# Patient Record
Sex: MALE | Race: WHITE | NOT HISPANIC OR LATINO | Employment: FULL TIME | URBAN - METROPOLITAN AREA
[De-identification: names, ages, dates, MRNs, and addresses within clinical notes are randomized per-mention and may not be internally consistent; named-entity substitution may affect disease eponyms.]

---

## 2023-07-07 DIAGNOSIS — E78.5 HYPERLIPIDEMIA, UNSPECIFIED: ICD-10-CM

## 2023-07-10 RX ORDER — ATORVASTATIN CALCIUM 80 MG/1
80 TABLET, FILM COATED ORAL DAILY
Qty: 90 TABLET | Refills: 3 | Status: SHIPPED | OUTPATIENT
Start: 2023-07-10

## 2024-04-24 DIAGNOSIS — T37.2X5A: Primary | ICD-10-CM

## 2024-04-24 RX ORDER — ATOVAQUONE AND PROGUANIL HYDROCHLORIDE 250; 100 MG/1; MG/1
1 TABLET, FILM COATED ORAL DAILY
Qty: 30 TABLET | Refills: 1 | Status: SHIPPED | OUTPATIENT
Start: 2024-04-24

## 2024-07-30 ENCOUNTER — LAB (OUTPATIENT)
Dept: LAB | Facility: LAB | Age: 79
End: 2024-07-30
Payer: COMMERCIAL

## 2024-07-30 ENCOUNTER — APPOINTMENT (OUTPATIENT)
Dept: PRIMARY CARE | Facility: CLINIC | Age: 79
End: 2024-07-30

## 2024-07-30 VITALS
TEMPERATURE: 97.8 F | HEIGHT: 70 IN | BODY MASS INDEX: 23.62 KG/M2 | WEIGHT: 165 LBS | DIASTOLIC BLOOD PRESSURE: 76 MMHG | HEART RATE: 64 BPM | OXYGEN SATURATION: 98 % | SYSTOLIC BLOOD PRESSURE: 151 MMHG

## 2024-07-30 DIAGNOSIS — Z12.5 ENCOUNTER FOR SCREENING FOR MALIGNANT NEOPLASM OF PROSTATE: ICD-10-CM

## 2024-07-30 DIAGNOSIS — Z00.00 HEALTHCARE MAINTENANCE: ICD-10-CM

## 2024-07-30 DIAGNOSIS — I34.1 MITRAL VALVE PROLAPSE: ICD-10-CM

## 2024-07-30 DIAGNOSIS — G89.29 CHRONIC BILATERAL LOW BACK PAIN WITHOUT SCIATICA: Primary | ICD-10-CM

## 2024-07-30 DIAGNOSIS — E78.2 MIXED HYPERLIPIDEMIA: ICD-10-CM

## 2024-07-30 DIAGNOSIS — Z12.11 ENCOUNTER FOR SCREENING FOR MALIGNANT NEOPLASM OF COLON: ICD-10-CM

## 2024-07-30 DIAGNOSIS — M54.50 CHRONIC BILATERAL LOW BACK PAIN WITHOUT SCIATICA: Primary | ICD-10-CM

## 2024-07-30 PROBLEM — I25.10 CALCIFICATION OF CORONARY ARTERY: Status: RESOLVED | Noted: 2018-03-19 | Resolved: 2024-07-30

## 2024-07-30 PROBLEM — E78.5 HYPERLIPIDEMIA: Status: ACTIVE | Noted: 2024-07-30

## 2024-07-30 PROBLEM — I25.84 CALCIFICATION OF CORONARY ARTERY: Status: RESOLVED | Noted: 2018-03-19 | Resolved: 2024-07-30

## 2024-07-30 PROBLEM — M51.36 LUMBAR DEGENERATIVE DISC DISEASE: Status: ACTIVE | Noted: 2018-03-19

## 2024-07-30 PROBLEM — M51.369 LUMBAR DEGENERATIVE DISC DISEASE: Status: ACTIVE | Noted: 2018-03-19

## 2024-07-30 PROBLEM — G47.00 INSOMNIA: Status: RESOLVED | Noted: 2024-07-30 | Resolved: 2024-07-30

## 2024-07-30 LAB
ALBUMIN SERPL BCP-MCNC: 4.3 G/DL (ref 3.4–5)
ALP SERPL-CCNC: 37 U/L (ref 33–136)
ALT SERPL W P-5'-P-CCNC: 25 U/L (ref 10–52)
ANION GAP SERPL CALC-SCNC: 13 MMOL/L (ref 10–20)
APPEARANCE UR: CLEAR
AST SERPL W P-5'-P-CCNC: 27 U/L (ref 9–39)
BASOPHILS # BLD AUTO: 0.03 X10*3/UL (ref 0–0.1)
BASOPHILS NFR BLD AUTO: 0.5 %
BILIRUB SERPL-MCNC: 1.1 MG/DL (ref 0–1.2)
BILIRUB UR STRIP.AUTO-MCNC: NEGATIVE MG/DL
BUN SERPL-MCNC: 22 MG/DL (ref 6–23)
CALCIUM SERPL-MCNC: 9.6 MG/DL (ref 8.6–10.6)
CHLORIDE SERPL-SCNC: 103 MMOL/L (ref 98–107)
CHOLEST SERPL-MCNC: 164 MG/DL (ref 0–199)
CHOLESTEROL/HDL RATIO: 2
CO2 SERPL-SCNC: 27 MMOL/L (ref 21–32)
COLOR UR: ABNORMAL
CREAT SERPL-MCNC: 1.05 MG/DL (ref 0.5–1.3)
EGFRCR SERPLBLD CKD-EPI 2021: 73 ML/MIN/1.73M*2
EOSINOPHIL # BLD AUTO: 0.04 X10*3/UL (ref 0–0.4)
EOSINOPHIL NFR BLD AUTO: 0.7 %
ERYTHROCYTE [DISTWIDTH] IN BLOOD BY AUTOMATED COUNT: 13.2 % (ref 11.5–14.5)
GLUCOSE SERPL-MCNC: 114 MG/DL (ref 74–99)
GLUCOSE UR STRIP.AUTO-MCNC: NORMAL MG/DL
HCT VFR BLD AUTO: 40 % (ref 41–52)
HCV AB SER QL: NONREACTIVE
HDLC SERPL-MCNC: 83.7 MG/DL
HGB BLD-MCNC: 12.9 G/DL (ref 13.5–17.5)
IMM GRANULOCYTES # BLD AUTO: 0.02 X10*3/UL (ref 0–0.5)
IMM GRANULOCYTES NFR BLD AUTO: 0.4 % (ref 0–0.9)
KETONES UR STRIP.AUTO-MCNC: NEGATIVE MG/DL
LDLC SERPL CALC-MCNC: 70 MG/DL
LEUKOCYTE ESTERASE UR QL STRIP.AUTO: ABNORMAL
LYMPHOCYTES # BLD AUTO: 1.18 X10*3/UL (ref 0.8–3)
LYMPHOCYTES NFR BLD AUTO: 21.6 %
MCH RBC QN AUTO: 31.6 PG (ref 26–34)
MCHC RBC AUTO-ENTMCNC: 32.3 G/DL (ref 32–36)
MCV RBC AUTO: 98 FL (ref 80–100)
MONOCYTES # BLD AUTO: 0.66 X10*3/UL (ref 0.05–0.8)
MONOCYTES NFR BLD AUTO: 12.1 %
NEUTROPHILS # BLD AUTO: 3.54 X10*3/UL (ref 1.6–5.5)
NEUTROPHILS NFR BLD AUTO: 64.7 %
NITRITE UR QL STRIP.AUTO: NEGATIVE
NON HDL CHOLESTEROL: 80 MG/DL (ref 0–149)
NRBC BLD-RTO: 0 /100 WBCS (ref 0–0)
PH UR STRIP.AUTO: 5.5 [PH]
PLATELET # BLD AUTO: 208 X10*3/UL (ref 150–450)
POTASSIUM SERPL-SCNC: 4.2 MMOL/L (ref 3.5–5.3)
PROT SERPL-MCNC: 6.7 G/DL (ref 6.4–8.2)
PROT UR STRIP.AUTO-MCNC: NEGATIVE MG/DL
PSA SERPL-MCNC: 3.52 NG/ML
RBC # BLD AUTO: 4.08 X10*6/UL (ref 4.5–5.9)
RBC # UR STRIP.AUTO: NEGATIVE /UL
RBC #/AREA URNS AUTO: NORMAL /HPF
SODIUM SERPL-SCNC: 139 MMOL/L (ref 136–145)
SP GR UR STRIP.AUTO: 1.02
TRIGL SERPL-MCNC: 51 MG/DL (ref 0–149)
UROBILINOGEN UR STRIP.AUTO-MCNC: NORMAL MG/DL
VLDL: 10 MG/DL (ref 0–40)
WBC # BLD AUTO: 5.5 X10*3/UL (ref 4.4–11.3)
WBC #/AREA URNS AUTO: NORMAL /HPF

## 2024-07-30 PROCEDURE — 85025 COMPLETE CBC W/AUTO DIFF WBC: CPT

## 2024-07-30 PROCEDURE — 80061 LIPID PANEL: CPT

## 2024-07-30 PROCEDURE — 1158F ADVNC CARE PLAN TLK DOCD: CPT | Performed by: STUDENT IN AN ORGANIZED HEALTH CARE EDUCATION/TRAINING PROGRAM

## 2024-07-30 PROCEDURE — 1160F RVW MEDS BY RX/DR IN RCRD: CPT | Performed by: STUDENT IN AN ORGANIZED HEALTH CARE EDUCATION/TRAINING PROGRAM

## 2024-07-30 PROCEDURE — 99397 PER PM REEVAL EST PAT 65+ YR: CPT | Performed by: STUDENT IN AN ORGANIZED HEALTH CARE EDUCATION/TRAINING PROGRAM

## 2024-07-30 PROCEDURE — 1036F TOBACCO NON-USER: CPT | Performed by: STUDENT IN AN ORGANIZED HEALTH CARE EDUCATION/TRAINING PROGRAM

## 2024-07-30 PROCEDURE — 99214 OFFICE O/P EST MOD 30 MIN: CPT | Performed by: STUDENT IN AN ORGANIZED HEALTH CARE EDUCATION/TRAINING PROGRAM

## 2024-07-30 PROCEDURE — 1123F ACP DISCUSS/DSCN MKR DOCD: CPT | Performed by: STUDENT IN AN ORGANIZED HEALTH CARE EDUCATION/TRAINING PROGRAM

## 2024-07-30 PROCEDURE — 81001 URINALYSIS AUTO W/SCOPE: CPT

## 2024-07-30 PROCEDURE — 1126F AMNT PAIN NOTED NONE PRSNT: CPT | Performed by: STUDENT IN AN ORGANIZED HEALTH CARE EDUCATION/TRAINING PROGRAM

## 2024-07-30 PROCEDURE — 36415 COLL VENOUS BLD VENIPUNCTURE: CPT

## 2024-07-30 PROCEDURE — 84153 ASSAY OF PSA TOTAL: CPT

## 2024-07-30 PROCEDURE — 80053 COMPREHEN METABOLIC PANEL: CPT

## 2024-07-30 PROCEDURE — 86803 HEPATITIS C AB TEST: CPT

## 2024-07-30 PROCEDURE — 1159F MED LIST DOCD IN RCRD: CPT | Performed by: STUDENT IN AN ORGANIZED HEALTH CARE EDUCATION/TRAINING PROGRAM

## 2024-07-30 ASSESSMENT — PAIN SCALES - GENERAL: PAINLEVEL: 0-NO PAIN

## 2024-07-30 NOTE — PROGRESS NOTES
"Subjective   Patient ID: Willy Cordero is a 78 y.o. male who presents for Annual Exam.    HPI   Doing well since last in! His research continues to thrive and one of his children is getting  this October. Willy has no acute concerns today. Several chronic conditions that are all well controlled.      Re: MVP - longstanding diagnosis. Due for TTE. Murmur is still prominent on exam, remains asx.     Re: back - See NSG notes. Reports subacute to chronic onset of low back pain. Mild-moderate in intensity, locks up on occasion. Pain is in lumbar area, with no radiation into the buttock. Used some conservative therapy (NSAIDs, stretching, heat/ice, etc...) but without regularity. Has not recently tried physical therapy and requests an order, which is reasonable.      Re: HLD - well controlled lipids.      Re: HM - due for PSA. CRS due.  All immunizations are UTD.     PMHx, FHx, Social Hx, Surg Hx personally reviewed at this appointment. No pertinent findings and/or changes from prior (if applicable).     ROS: Denies wt gain/loss f/c HA LoC CP SOB NVDC. See HPI above, and scanned sheet (if applicable). All other systems are reviewed and are without complaint.     Review of Systems    Objective   /76   Pulse 64   Temp 36.6 °C (97.8 °F)   Ht 1.778 m (5' 10\")   Wt 74.8 kg (165 lb)   SpO2 98%   BMI 23.68 kg/m²     Physical Exam  Gen: well developed in NAD. AAO x3.  HEENT: NC/AT. Anicteric sclera, symmetric pupils. MMM no thrush. Wearing hearing aides.   Neck: Soft, supple. No LAD. No goiter.   CV: Bradycardic. III/VI mid-late systolic murmur at apex radiating to the axilla.   Pulm: CTAB no w/r/r, good air exchange  GI: soft NTND BS+ no hsm  Ext: WWP no edema  Neuro: II-XII grossly intact, nonfocal systemic findings  MSK: 5/5 strength b/l UE and LE  Gait: unremarkable     Lab Results   Component Value Date    WBC 4.0 (L) 12/14/2022    HGB 12.1 (L) 12/14/2022    HCT 37.6 (L) 12/14/2022     12/14/2022 "    CHOL 157 12/14/2022    TRIG 38 12/14/2022    HDL 84.7 12/14/2022    ALT 27 12/14/2022    AST 33 12/14/2022     12/14/2022    K 4.6 12/14/2022     12/14/2022    CREATININE 1.18 12/14/2022    BUN 25 (H) 12/14/2022    CO2 28 12/14/2022    TSH 2.18 10/13/2021    PSA 2.94 10/13/2021     par    MR lumbar spine wo IV contrast  Narrative: Interpreted By:  LEXIS OSPINA MD, PHD and ROWAN BURGOS MD  MRN: [id number]  Patient Name: AUBREY VALLES     STUDY:  MRI L-SPINE WO;  5/3/2023 7:00 pm     INDICATION:  Previous fusion, L3-5 pain, right lumbar radiculopathy.     COMPARISON:  Lumbar spine MRI dated 05/16/2017, Lumbosacral spine radiographs  dated 04/19/2023     ACCESSION NUMBER(S):  [id number]     ORDERING CLINICIAN:  KAM LOW     TECHNIQUE:  Sagittal T1, T2, STIR, axial T1 and T2 weighted images of the lumbar  spine were acquired. Metal artifact reduction techniques were  utilized.     FINDINGS:  5 lumbar-type vertebral bodies are again noted, the last well-formed  disc space is labeled L5-S1.     Alignment: Mild retrolisthesis at L2-3 is more pronounced than on the  prior study, the alignment is otherwise preserved.     Vertebrae/Intervertebral Discs: Changes of L3-5 laminectomy and  posterior fusion with associated susceptibility artifact are similar  to the recent radiographs given the differences in technique but new  from the prior MRI. Vertebral body height is preserved. Degenerative  endplate changes with associated marrow edema at L2-3 and L5-S1. T1  hyperintense nodules at L2 and L3 are unchanged and consistent with  benign hemangiomas. Multilevel loss of normal disc T2 signal and disc  height.     Conus Medullaris: The lower thoracic cord appears unremarkable. The  conus terminates at L1. The cauda equina is unremarkable.     T12-L1: Facet hypertrophy without canal or foraminal narrowing.     L1-2: Disc bulge, facet hypertrophy, and ligamentum flavum thickening  with mild narrowing of both  subarticular recesses, slightly worsened  from previous.     L2-3: Disc bulge, facet hypertrophy, and ligamentum flavum thickening  with diffuse mild spinal canal narrowing and mild bilateral neural  foramen narrowing, slightly worsened from previous.     L3-4: Postoperative changes with a disc osteophyte complex facet  hypertrophy. There is no residual central spinal canal narrowing and  mild narrowing of both subarticular recesses is improved from  previous, mild bilateral neural foramen narrowing appears unchanged.     L4-5: Postoperative changes with a disc osteophyte complex and facet  hypertrophy. There is no residual spinal canal narrowing, moderate  bilateral neural foramen narrowing is slightly improved from previous.     L5-S1: Postoperative changes with a disc bulge, facet hypertrophy,  and ligamentum flavum thickening. Mild left and severe right neural  foramen narrowing is similar to previous, no spinal canal narrowing.     Postoperative changes in the posterior soft tissues with associated  granulation tissue and paraspinal muscular atrophy, no associated  fluid collection.     Impression: 1. Changes of L3-5 laminectomy and posterior fusion with improved  patency of the spinal canal.  2. Slight progression degenerative changes in the upper lumbar spine,  no new high-grade canal or foraminal narrowing is identified.     I personally reviewed the image(s) / study and the interpretation of  Dr. Willy Magdaleno MD. I agree with the findings as stated. This  study was interpreted at [facility], [place], [place].      Current Outpatient Medications   Medication Instructions    atorvastatin (LIPITOR) 80 mg, oral, Daily        Assessment/Plan   # back pain with known lumbar stenosis/DDD  - conservative management with PT, massage therapy, acupuncture and topical creams if amenable  - follow up NSG if considerably worsens, see Dr. Melo's notes (2023)     # MVP: asymptomatic, due for repeat  testing q2 years  - TTE due, will carbon copy results to Dr. Muniz    # hyperlipidemia  - lipid panel, ASCVD+ score based on these values if age appropriate  - continue statin      # Health Maintenance  - routine blood work  - Colonoscopy: due, ordered today   - PSA: due, ordered today   - Immunizations: UTD    Problem List Items Addressed This Visit             ICD-10-CM    Hyperlipidemia E78.5    Relevant Orders    Comprehensive Metabolic Panel    CBC and Auto Differential    Lipid Panel    Urinalysis with Reflex Microscopic    Mitral valve prolapse I34.1    Relevant Orders    Transthoracic Echo (TTE) Complete    Urinalysis with Reflex Microscopic     Other Visit Diagnoses         Codes    Chronic bilateral low back pain without sciatica    -  Primary M54.50, G89.29    Relevant Orders    Referral to Physical Therapy    Encounter for screening for malignant neoplasm of colon     Z12.11    Relevant Orders    Colonoscopy Screening; Average Risk Patient    Healthcare maintenance     Z00.00    Relevant Orders    Comprehensive Metabolic Panel    CBC and Auto Differential    Lipid Panel    Prostate Specific Antigen    Hepatitis C Antibody    Urinalysis with Reflex Microscopic    Encounter for screening for malignant neoplasm of prostate     Z12.5    Relevant Orders    Prostate Specific Antigen

## 2024-07-30 NOTE — PATIENT INSTRUCTIONS
Please stop at the lab ([address]) to complete your blood and/or urine work that I've ordered for you.    I will contact you with the results at my soonest convenience. I strongly urge you to use [employer] as this is the quickest and easiest way to access your results and receive my correspondences.    Your medications are up to date today, I will renew them when a refill is due.    I have ordered a screening colonoscopy. Please call [phone] to schedule the appointment. I recommend being the first appointment of the day, if possible.     I have referred you to physical therapy to help with strengthening, stretching, and back pain. Please call [phone] to schedule at a convenient  location.      I have ordered an echocardiogram to better evaluate your heart. Please stop by the cardiology department on the third floor or call [phone] to schedule this study.     See me yearly for a complete physical exam, and sooner as needed for sick visits

## 2024-08-02 DIAGNOSIS — Z12.11 COLON CANCER SCREENING: ICD-10-CM

## 2024-08-02 RX ORDER — POLYETHYLENE GLYCOL 3350, SODIUM CHLORIDE, SODIUM BICARBONATE, POTASSIUM CHLORIDE 420; 11.2; 5.72; 1.48 G/4L; G/4L; G/4L; G/4L
POWDER, FOR SOLUTION ORAL
Qty: 4000 ML | Refills: 0 | Status: SHIPPED | OUTPATIENT
Start: 2024-08-02

## 2024-08-21 ENCOUNTER — HOSPITAL ENCOUNTER (OUTPATIENT)
Dept: CARDIOLOGY | Facility: CLINIC | Age: 79
Discharge: HOME | End: 2024-08-21
Payer: COMMERCIAL

## 2024-08-21 DIAGNOSIS — I34.1 MITRAL VALVE PROLAPSE: ICD-10-CM

## 2024-08-21 LAB
AORTIC VALVE PEAK VELOCITY: 1.1 M/S
AV PEAK GRADIENT: 4.8 MMHG
AVA (PEAK VEL): 2.54 CM2
EJECTION FRACTION APICAL 4 CHAMBER: 69.3
EJECTION FRACTION: 63 %
LEFT ATRIUM VOLUME AREA LENGTH INDEX BSA: 78 ML/M2
LEFT VENTRICLE INTERNAL DIMENSION DIASTOLE: 4.84 CM (ref 3.5–6)
LEFT VENTRICULAR OUTFLOW TRACT DIAMETER: 2.11 CM
MITRAL VALVE E/A RATIO: 2.83
RIGHT VENTRICLE FREE WALL PEAK S': 14 CM/S
RIGHT VENTRICLE PEAK SYSTOLIC PRESSURE: 44.7 MMHG
TRICUSPID ANNULAR PLANE SYSTOLIC EXCURSION: 2 CM

## 2024-08-21 PROCEDURE — 93306 TTE W/DOPPLER COMPLETE: CPT

## 2024-08-21 PROCEDURE — 93306 TTE W/DOPPLER COMPLETE: CPT | Performed by: INTERNAL MEDICINE

## 2024-08-22 NOTE — RESULT ENCOUNTER NOTE
Reviewed echocardiogram results with patient.  Severe MR with normal LV function but asymptomatic.  With increasing pulmonary pressure, he is likely candidate for elective surgical repair (grade 2C).  Some progression as compared to previous echocardiogram.  Will see him in the office in late September, after his daughter's wedding, then discuss cardiothoracic surgery referral.  No other comorbidities.  Never had atrial fibrillation.

## 2024-08-27 ENCOUNTER — EVALUATION (OUTPATIENT)
Dept: PHYSICAL THERAPY | Facility: CLINIC | Age: 79
End: 2024-08-27
Payer: COMMERCIAL

## 2024-08-27 DIAGNOSIS — M51.36 LUMBAR DEGENERATIVE DISC DISEASE: Primary | ICD-10-CM

## 2024-08-27 DIAGNOSIS — M54.50 CHRONIC BILATERAL LOW BACK PAIN WITHOUT SCIATICA: ICD-10-CM

## 2024-08-27 DIAGNOSIS — G89.29 CHRONIC BILATERAL LOW BACK PAIN WITHOUT SCIATICA: ICD-10-CM

## 2024-08-27 PROCEDURE — 97110 THERAPEUTIC EXERCISES: CPT | Mod: GP | Performed by: PHYSICAL THERAPIST

## 2024-08-27 PROCEDURE — 97161 PT EVAL LOW COMPLEX 20 MIN: CPT | Mod: GP | Performed by: PHYSICAL THERAPIST

## 2024-08-27 ASSESSMENT — ENCOUNTER SYMPTOMS
OCCASIONAL FEELINGS OF UNSTEADINESS: 0
DEPRESSION: 0
LOSS OF SENSATION IN FEET: 0

## 2024-08-27 ASSESSMENT — PATIENT HEALTH QUESTIONNAIRE - PHQ9
SUM OF ALL RESPONSES TO PHQ9 QUESTIONS 1 AND 2: 0
1. LITTLE INTEREST OR PLEASURE IN DOING THINGS: NOT AT ALL
2. FEELING DOWN, DEPRESSED OR HOPELESS: NOT AT ALL

## 2024-08-27 NOTE — PROGRESS NOTES
Physical Therapy  Physical Therapy Orthopedic Evaluation    Patient Name: Willy Cordero  MRN: [id number]  Today's Date: 8/27/2024  Time Calculation  Start Time: 0657  Stop Time: 0739  Time Calculation (min): 42 min    PT Evaluation Time Entry  PT Evaluation (Low) Time Entry: 15  PT Therapeutic Procedures Time Entry  Therapeutic Exercise Time Entry: 25       Insurance:  Visit number: 1 of 40  Authorization info: No Auth  Insurance Type: Payor: MEDICAL [facility] / Plan: [employer]ICAL [employer] / Product Type: *No Product type* /      Current Problem     Problem List Items Addressed This Visit             ICD-10-CM    Lumbar degenerative disc disease - Primary M51.36    Relevant Orders    Follow Up In Physical Therapy     Other Visit Diagnoses         Codes    Chronic bilateral low back pain without sciatica     M54.50, G89.29            Referred by: Tyson Lr MD    Precautions:   Lumbar Fusion L3-L5 2018    Subjective:   Subjective   Chief Complaint: Chronic Low back Pain  Onset: Low back pain  VIKAS: Lumbar Fusion    General:   subacute to chronic onset of low back pain. Mild-moderate in intensity, locks up on occasion. Pain is in lumbar area, with no radiation into the buttock. Used some conservative therapy (NSAIDs, stretching, heat/ice, etc...) but without regularity.   Current Condition:   Same, occasional muscle cramps 1-2x every week or two while in bed, either leg. Pain meds PRN, No other treatments    Pain:     Location: Low back pain   Rating: Best-0, Current-0, Worst-5  Description: muscle spasms and low back discomfort  Aggravating Factors:  standing, walking, weightbearing exercise  Relieving Factors:  sitting, laying down    Relevant Information (PMH & Previous Tests/Imaging): No recent imaging     Previous Interventions/Treatments: Physical Therapy    Prior Level of Function (PLOF)  Patient previously independent with all ADLs  Exercise/Physical Activity: Active outdoors, would like to  return to [place]  Work/School: Physician in medical research- traveling    Patients Living Environment: Reviewed and no concern  Primary Language: English  Patient's Goal(s) for Therapy: Reduce low back pain, increase standing tolerance greater than 10 minutes    Red Flags: Do you have any of the following? No  Fever/chills, unexplained weight changes, dizziness/fainting, unexplained change in bowel or bladder functions, unexplained malaise or muscle weakness, night pain/sweats, numbness or tingling    Objective:  Objective     Palpation/Observation  Mild tenderness to palpation right side lumbar paraspinals  Posture  Patient does present with forward head position mild, rounded shoulders, flexed at the trunk 10 degrees static standing position  Special Testing  Negative slump test, straight leg raise, compression and gap testing, logroll  Positive Vincent test  ROM  8/27/2024  Trunk  Flexion 50% limited without pain  Extension patient is only able to achieve the neutral position from being forward flexed  Bilateral rotation is within normal limits  Hip range of motion 40 degrees bilateral internal/external at 90 degrees of hip flexion, logroll position full range of motion  Bilateral hip flexion roughly 110 degrees  No restriction, within functional limits at the foot and knee  Strength  8/27/2024  Lower extremity strength 4/5 bilateral knee flexion, all of the areas are within normal limits  Sensation  Today's presentation shows no radicular paresthesias, full sensation  Reflexes  Negative clonus    Transfers: Independently performed with upper extremity assistance  Gait: Independent without assistive device, shortened stride length bilaterally  SL Balance:   DL Squat: Sit to stand performed with minimal upper extremity assistance independently  SL Squat:      Outcome Measures:  Other Measures  Oswestry Disablity Index (ATUL): 22%   8/27/2024  Treatments:  Ther-EX:  - Supine Single Knee to Chest Stretch  - 1-2 x  "daily - 3 reps - 30\" hold  - Supine Hamstring Stretch  - 1-2 x daily - 3 reps - 30\" hold  - Supine Piriformis Stretch with Foot on Ground  - 1-2 x daily - 1 sets - 3 reps - 30\" hold  - Supine Bridge with Mini Swiss Ball Between Knees  - 1 x daily - 1 sets - 15 reps - 2\" hold  - Supine Posterior Pelvic Tilt  - 1 x daily - 1 sets - 15 reps  - Standing Hip Flexor Stretch  - 1-2 x daily - 1 sets - 3 reps - 15-20\" hold  - Hip Flexor Stretch on Step  - 1-2 x daily - 1 sets - 3 reps - 15-20\" hold  PT Evaluation Time Entry  PT Evaluation (Low) Time Entry: 15  PT Therapeutic Procedures Time Entry  Therapeutic Exercise Time Entry: 25       EDUCATION: Home exercise program, plan of care, activity modifications, pain management, and injury pathology     Code: [id number]    Medical History Form: Reviewed (scanned into chart)  Reviewed medical form, [place], Falls risk, Islam beliefs, PHQ     Screening  Frequency  Date Last Completed   Spiritual and Cultural Beliefs   Screening  each visit or episode of care    Falls Risk Screening  every ambulatory visit [unfilled]   Pain Screening  annually at primary care visit  7/30/2024   Domestic Violence screening  annually at primary care visit    Elder Abuse Screening  annually at primary care visit    Depression Screening  annually in the primary care setting 8/27/2024   Suicide Risk Screening  annually in the primary care setting    Nutrition and Food Insecurity   Screening  at least annually at primary care visit     Key Learner  annually in the primary care setting    Drug Screen  7/30/2024  1:07 PM   Alcohol Screen  7/30/2024  1:07 PM   Advance Directive         Time Calculation  Start Time: 0657  Stop Time: 0739  Time Calculation (min): 42 min  PT Evaluation Time Entry  PT Evaluation (Low) Time Entry: 15 PT Therapeutic Procedures Time Entry  Therapeutic Exercise Time Entry: 25          Assessment: Patient presents with signs and symptoms consistent with lumbar DDD, " resulting in limited participation in pain-free ADLs and inability to perform at their prior level of function. Pt would benefit from physical therapy to address the impairments found & listed previously in the objective section in order to return to safe and pain-free ADLs and prior level of function.     Complexity:Low  Prognosis: Fair  Response to care: Good  Clinical Presentation: Stable and/or uncomplicated characteristics  Personal Factors: None    Problem List:  Pain  Function  Mobility  Activity tolerance  Plan:     Planned Interventions include: therapeutic exercise, self-care home management, manual therapy, therapeutic activities, gait training, neuromuscular coordination, vasopneumatic, dry needling, aquatic therapy    Next Treatment: Progress soft tissue mobilization, hip flexor stretching, core strengthening, exercises to promote lumbar extension  Frequency: 1 x Week  Duration: 6 Weeks  Goals: Set and discussed today  Active       PT Problem       PT Goal 1       Start:  08/27/24    Expected End:  10/22/24       1.  Patient to be independent with home exercise program  2.  Patient to have improved Oswestry disability index score by 10% for decreased pain with daily activities  3.  Patient to have negative Hardy's test for improved hip mobility, to improve walking pattern  4.  Patient to report improved ability to stand longer than 15 minutes without pain/spasms through the low back  5.  Patient to have pain less than 3/10 for improved exercise tolerance greater than 20 minutes             Plan of care was developed with input and agreement by the patient      Nii Dowling, PT

## 2024-09-24 PROBLEM — K21.9 GASTROESOPHAGEAL REFLUX DISEASE: Status: ACTIVE | Noted: 2024-09-24

## 2024-09-24 PROBLEM — N52.9 ERECTILE DYSFUNCTION: Status: ACTIVE | Noted: 2024-09-24

## 2024-09-24 PROBLEM — H81.10 BENIGN PAROXYSMAL POSITIONAL VERTIGO: Status: ACTIVE | Noted: 2024-09-24

## 2024-09-24 PROBLEM — R76.11 POSITIVE REACTION TO TUBERCULIN SKIN TEST: Status: ACTIVE | Noted: 2024-09-24

## 2024-09-25 ENCOUNTER — OFFICE VISIT (OUTPATIENT)
Dept: CARDIOLOGY | Facility: CLINIC | Age: 79
End: 2024-09-25
Payer: COMMERCIAL

## 2024-09-25 VITALS
WEIGHT: 164.06 LBS | HEIGHT: 71 IN | SYSTOLIC BLOOD PRESSURE: 135 MMHG | HEART RATE: 53 BPM | DIASTOLIC BLOOD PRESSURE: 70 MMHG | OXYGEN SATURATION: 96 % | BODY MASS INDEX: 22.97 KG/M2

## 2024-09-25 DIAGNOSIS — I34.1 MITRAL VALVE PROLAPSE: Primary | ICD-10-CM

## 2024-09-25 DIAGNOSIS — I34.0 SEVERE MITRAL REGURGITATION: ICD-10-CM

## 2024-09-25 PROCEDURE — 1036F TOBACCO NON-USER: CPT | Performed by: INTERNAL MEDICINE

## 2024-09-25 PROCEDURE — 99214 OFFICE O/P EST MOD 30 MIN: CPT | Performed by: INTERNAL MEDICINE

## 2024-09-25 PROCEDURE — 1125F AMNT PAIN NOTED PAIN PRSNT: CPT | Performed by: INTERNAL MEDICINE

## 2024-09-25 PROCEDURE — 1160F RVW MEDS BY RX/DR IN RCRD: CPT | Performed by: INTERNAL MEDICINE

## 2024-09-25 PROCEDURE — 1123F ACP DISCUSS/DSCN MKR DOCD: CPT | Performed by: INTERNAL MEDICINE

## 2024-09-25 PROCEDURE — 93005 ELECTROCARDIOGRAM TRACING: CPT | Performed by: INTERNAL MEDICINE

## 2024-09-25 PROCEDURE — 1159F MED LIST DOCD IN RCRD: CPT | Performed by: INTERNAL MEDICINE

## 2024-09-25 ASSESSMENT — COLUMBIA-SUICIDE SEVERITY RATING SCALE - C-SSRS
1. IN THE PAST MONTH, HAVE YOU WISHED YOU WERE DEAD OR WISHED YOU COULD GO TO SLEEP AND NOT WAKE UP?: NO
6. HAVE YOU EVER DONE ANYTHING, STARTED TO DO ANYTHING, OR PREPARED TO DO ANYTHING TO END YOUR LIFE?: NO
2. HAVE YOU ACTUALLY HAD ANY THOUGHTS OF KILLING YOURSELF?: NO

## 2024-09-25 ASSESSMENT — ENCOUNTER SYMPTOMS
LOSS OF SENSATION IN FEET: 0
DEPRESSION: 0
BACK PAIN: 1
DYSPNEA ON EXERTION: 0
SYNCOPE: 0
PALPITATIONS: 0
OCCASIONAL FEELINGS OF UNSTEADINESS: 1

## 2024-09-25 ASSESSMENT — PATIENT HEALTH QUESTIONNAIRE - PHQ9
2. FEELING DOWN, DEPRESSED OR HOPELESS: NOT AT ALL
SUM OF ALL RESPONSES TO PHQ9 QUESTIONS 1 AND 2: 0
1. LITTLE INTEREST OR PLEASURE IN DOING THINGS: NOT AT ALL

## 2024-09-25 ASSESSMENT — PAIN SCALES - GENERAL: PAINLEVEL: 2

## 2024-09-25 NOTE — PROGRESS NOTES
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    HPI/Summary  Willy Cordero is a 78 y.o. male who presents for followup cardiology evaluation.     The patient presents with hyperlipidemia and mitral regurgitation.  In 2016, a coronary calcium score was 437.  He has been maintained on high-dose atorvastatin.    He presents to review a recent echocardiogram, dated August 21, 2024.  The LV ejection fraction is 63%, with normal RV systolic function.  The mitral valve is myxomatous, with severe eccentric posterior MR.  There is a large proximal flow convergence zone and possible pulmonary vein flow reversal.  The RVSP is elevated at 45 mmHg.  We reviewed the echocardiogram and the plan was for an office visit followed by cardiothoracic surgery consultation.  No prior history of atrial fibrillation.    Continues on atorvastatin 80 mg daily, no other medications.  A recent LDL cholesterol was 70 mg/dL.    He feels well.  No palpitations.  Not dyspneic with usual daily activities.  Still travels.  Some back pain secondary to scoliosis, he does some physical therapy exercises.  Specialty Problems          Cardiology Problems    Mitral valve prolapse    Hyperlipidemia        Past Medical History:   Diagnosis Date    Benign paroxysmal vertigo, unspecified ear     Benign paroxysmal positional vertigo    Calcification of coronary artery 03/19/2018    CT cardiac scoring done 12/15/16 with score of 436.      Cataract     Heart disease     Heart murmur     HL (hearing loss)     Insomnia 07/30/2024    Scoliosis           Past Surgical History:   Procedure Laterality Date    BACK SURGERY      LUMBAR LAMINECTOMY  07/04/2017    Laminectomy Lumbar    VASECTOMY              Social History     Tobacco Use    Smoking status: Never     "Smokeless tobacco: Never   Substance Use Topics    Alcohol use: Yes     Alcohol/week: 10.0 standard drinks of alcohol     Types: 8 Glasses of wine, 2 Shots of liquor per week    Drug use: Never                 Allergies   Allergen Reactions    Sulfa (Sulfonamide Antibiotics) Hives and Other         Current Outpatient Medications   Medication Instructions    atorvastatin (LIPITOR) 80 mg, oral, Daily       Review of Systems   Cardiovascular:  Negative for chest pain, dyspnea on exertion, leg swelling, palpitations and syncope.   Musculoskeletal:  Positive for back pain.   All other systems reviewed and are negative.       Vital Signs:  Vitals:    09/25/24 1457   BP: 135/70   BP Location: Left arm   Patient Position: Sitting   BP Cuff Size: Large adult   Pulse: 53   SpO2: 96%   Weight: 74.4 kg (164 lb 1 oz)   Height: 1.803 m (5' 11\")     Wt Readings from Last 2 Encounters:   09/25/24 74.4 kg (164 lb 1 oz)   07/30/24 74.8 kg (165 lb)     Body mass index is 22.88 kg/m².       Physical Exam:    He appeared well.  The lungs were clear.  There was a grade 3 mid-to-late systolic murmur at the apex, radiating to the axilla.  No S3.  No edema.     Last Labs:  CMP:  Recent Labs     07/30/24  1410 12/14/22  0919 10/13/21  0831 06/17/20  0658    139 138 141   K 4.2 4.6 4.9 4.6    105 103 104   CO2 27 28 27 29   ANIONGAP 13 11 13 13   BUN 22 25* 21 24*   CREATININE 1.05 1.18 1.12 1.01   EGFR 73  --   --   --    GLUCOSE 114* 101* 91 86     Recent Labs     07/30/24  1410 12/14/22  0919 10/13/21  0831 06/17/20  0658   ALBUMIN 4.3 3.9 4.1 4.1   ALKPHOS 37 36 46 44   ALT 25 27 30 30   AST 27 33 31 32   BILITOT 1.1 0.7 0.8 0.4     CBC:  Recent Labs     07/30/24  1410 12/14/22  0919 10/13/21  0831 06/17/20  0658   WBC 5.5 4.0* 7.1 5.5   HGB 12.9* 12.1* 13.1* 13.8   HCT 40.0* 37.6* 38.6* 42.2    221 215 229   MCV 98 98 97 99     COAG: No results for input(s): \"INR\", \"DDIMERVTE\" in the last 91313 " "hours.  HEME/ENDO:  Recent Labs     10/13/21  0831   TSH 2.18      CARDIAC: No results for input(s): \"LDH\", \"CKMB\", \"TROPHS\", \"BNP\" in the last 33569 hours.    No lab exists for component: \"CK\", \"CKMBP\"  Recent Labs     07/30/24  1410 12/14/22  0919 10/13/21  0831 06/17/20  0658   CHOL 164 157 154 175   LDLF  --  65 72 75   HDL 83.7 84.7 74.2 90.5   TRIG 51 38 40 47       Last Cardiology Tests:    ECG:    Sinus rhythm, right bundle branch block, LVH with QRS widening.    Echo:  Echo Results:  Transthoracic Echo (TTE) Complete 08/21/2024    [facility], [address] [place] [address]  Tel [phone] and Fax [phone]    TRANSTHORACIC ECHOCARDIOGRAM REPORT      Patient Name:      AUBREY Giron Physician:    [id number] Maya Everett MD  Study Date:        8/21/2024            Ordering Provider:    [id number] LOGAN LAZARO  MRN/PID:           [id number]             Fellow:  Accession#:        [id number]         Nurse:  Date of Birth/Age: 1945 / 78      Sonographer:          Elvira connelly RDCS  Gender:            M                    Additional Staff:  Height:            177.80 cm            Admit Date:  Weight:            74.84 kg             Admission Status:     Outpatient  BSA / BMI:         1.92 m2 / 23.67      Encounter#:           [id number]  kg/m2  Blood Pressure:    161/75 mmHg          Department Location:  [facility]  Echo Lab    Study Type:    TRANSTHORACIC ECHO (TTE) COMPLETE  Diagnosis/ICD: Nonrheumatic mitral (valve) prolapse-I34.1  Indication:    MVP  CPT Code:      Echo Complete w Full Doppler-[id number]    Patient History:  Pertinent History: HLD; MVP; Mod MR.    Study Detail: The following Echo studies were performed: 2D, M-Mode, Doppler and  color flow.      PHYSICIAN INTERPRETATION:  Left Ventricle: Left ventricular ejection fraction is normal, calculated by Orr's biplane at 63%. " There are no regional left ventricular wall motion abnormalities. The left ventricular cavity size is normal. Left ventricular diastolic filling was indeterminate.  Left Atrium: The left atrium is severely dilated.  Right Ventricle: The right ventricle is normal in size. There is normal right ventricular global systolic function.  Right Atrium: The right atrium is normal in size.  Aortic Valve: The aortic valve is trileaflet. There is no evidence of aortic valve regurgitation. The peak instantaneous gradient of the aortic valve is 4.8 mmHg.  Mitral Valve: The mitral valve is abnormal. There is severe mitral valve regurgitation. Myxomatous appearing MV with significant anterior leaflet prolapse with severe ( 4+) very eccentric, posteriorly directed MR. There is a very large proximal flow convergence zone and possible pulmonary vein flow reversal. ( possible bileaflet prolapse with AML prolapse >> PML).  Tricuspid Valve: The tricuspid valve is structurally normal. There is moderate tricuspid regurgitation. The Doppler estimated RVSP is mildly elevated at 44.7 mmHg. Thin pliable , somewhat redundant TV leaflets with moderate TR.  Pulmonic Valve: The pulmonic valve is structurally normal. There is mild pulmonic valve regurgitation.  Pericardium: There is no pericardial effusion noted.  Aorta: The aortic root is normal. There is mild dilatation of the ascending aorta.  Systemic Veins: The inferior vena cava appears dilated. There is IVC inspiratory collapse greater than 50%.  In comparison to the previous echocardiogram(s): Compared with the prior exam from 12/22/2022 the LA was already severely dilated and there was already a thickened, redundant anterior mitral leaflet with prolapse causing posteriorly directed MR. There is now severe posteriorly directed MR and the RVSP has increased from mildly elevated at 37.3mmHg to 44.7mmHg.      CONCLUSIONS:  1. Left ventricular ejection fraction is normal, calculated by  Orr's biplane at 63%.  2. Left ventricular diastolic filling was indeterminate.  3. There is normal right ventricular global systolic function.  4. The left atrium is severely dilated.  5. Myxomatous appearing MV with significant anterior leaflet prolapse with severe ( 4+) very eccentric, posteriorly directed MR. There is a very large proximal flow convergence zone and possible pulmonary vein flow reversal. ( possible bileaflet prolapse with AML prolapse >> PML).  6. Severe mitral valve regurgitation.  7. Mildly elevated RVSP.  8. Moderate tricuspid regurgitation visualized.  9. Compared with the prior exam from 12/22/2022 the LA was already severely dilated and there was already a thickened, redundant anterior mitral leaflet with prolapse causing posteriorly directed MR. There is now severe posteriorly directed MR and the RVSP has increased from mildly elevated at 37.3mmHg to 44.7mmHg.    QUANTITATIVE DATA SUMMARY:  2D MEASUREMENTS:  Normal Ranges:  Ao Root d:     3.50 cm  (2.0-3.7cm)  LAs:           4.74 cm  (2.7-4.0cm)  IVSd:          1.05 cm  (0.6-1.1cm)  LVPWd:         1.01 cm  (0.6-1.1cm)  LVIDd:         4.84 cm  (3.9-5.9cm)  LVIDs:         2.61 cm  LV Mass Index: 94 g/m2  LVEDV Index:   68 ml/m2  LV % FS        46.0 %    LA VOLUME:  Normal Ranges:  LA Vol A4C:       151.1 ml   (22+/-6mL/m2)  LA Vol A2C:       142.4 ml  LA Vol BP:        150.1 ml  LA Vol Index A4C: 78.6ml/m2  LA Vol Index A2C: 74.0 ml/m2  LA Vol Index BP:  78.0 ml/m2  LA Volume Index:  78.2 ml/m2  LA Vol A4C:       137.5 ml  LA Vol A2C:       135.2 ml  LA Vol Index BSA: 70.9 ml/m2    RA VOLUME BY A/L METHOD:  Normal Ranges:  RA Area A4C: 14.7 cm2    AORTA MEASUREMENTS:  Normal Ranges:  Ao Sinus, d: 3.50 cm (2.1-3.5cm)  Asc Ao, d:   3.50 cm (2.1-3.4cm)    LV SYSTOLIC FUNCTION BY 2D PLANIMETRY (MOD):  Normal Ranges:  EF-A4C View:    69 % (>=55%)  EF-A2C View:    58 %  EF-Biplane:     63 %  LV EF Reported: 63 %    LV DIASTOLIC  FUNCTION:  Normal Ranges:  MV Peak E:        0.92 m/s    (0.7-1.2 m/s)  MV Peak A:        0.33 m/s    (0.42-0.7 m/s)  E/A Ratio:        2.83        (1.0-2.2)  MV e'             0.085 m/s   (>8.0)  MV lateral e'     0.08 m/s  MV medial e'      0.09 m/s  MV A Dur:         133.21 msec  E/e' Ratio:       10.85       (<8.0)  MV DT:            300 msec    (150-240 msec)  PulmV Sys Art:    37.22 cm/s  PulmV Gregory Art:   52.39 cm/s  PulmV S/D Art:    0.71  PulmV A Revs Art: 24.99 cm/s  PulmV A Revs Dur: 134.16 msec    MITRAL VALVE:  Normal Ranges:  MV DT: 300 msec (150-240msec)    AORTIC VALVE:  Normal Ranges:  AoV Vmax:      1.10 m/s (<=1.7m/s)  AoV Peak P.8 mmHg (<20mmHg)  LVOT Max Art:  0.80 m/s (<=1.1m/s)  LVOT VTI:      14.74 cm  LVOT Diameter: 2.11 cm  (1.8-2.4cm)  AoV Area,Vmax: 2.54 cm2 (2.5-4.5cm2)      RIGHT VENTRICLE:  RV Basal 4.02 cm  RV Mid   2.50 cm  RV Major 7.7 cm  TAPSE:   20.0 mm  RV s'    0.14 m/s    TRICUSPID VALVE/RVSP:  Normal Ranges:  Peak TR Velocity: 3.03 m/s  RV Syst Pressure: 44.7 mmHg (< 30mmHg)  IVC Diam:         3.30 cm    PULMONIC VALVE:  Normal Ranges:  PV Accel Time: 114 msec (>120ms)  PV Max Art:    0.8 m/s  (0.6-0.9m/s)  PV Max P.4 mmHg    Pulmonary Veins:  PulmV A Revs Dur: 134.16 msec  PulmV A Revs Art: 24.99 cm/s  PulmV Gregory Art:   52.39 cm/s  PulmV S/D Art:    0.71  PulmV Sys Art:    37.22 cm/s    AORTA:  Asc Ao Diam 3.45 cm      [id number] Maya Everett MD  Electronically signed on 2024 at 11:21:23 AM        ** Final **       Cath:      Stress Test:  Stress Results:  No results found for this or any previous visit from the past 365 days.         Cardiac Imaging:        Assessment/Plan     The patient has myxomatous mitral valve disease with 4+ mitral regurgitation.  There has been some increase in the mean PA pressure.  The ECG now shows LVH, not present in 2018, and a new right bundle branch block.  Although he is asymptomatic, with progressive elevation of the RVSP from  37- to 45 in less than 2 years, it would be appropriate to consider surgical consultation at this time.  He does not have hypertension, his lipids are very well-controlled, and he has no other comorbidities.  He should be an excellent candidate for mitral valve repair.  Benefits and risks discussed, and we have initiated a cardiothoracic surgery consultation.      Orders:  Orders Placed This Encounter   Procedures    Referral to Cardiac Surgery    ECG 12 lead (Clinic Performed)      Followup Appts:  Future Appointments   Date Time Provider Department Center   11/8/2024  8:25 AM Drew Latif MD [id number]           ____________________________________________________________  Khanh Muniz MD    Senior Attending Physician  [facility]  [facility]    [facility]  [facility] [employer]

## 2024-09-25 NOTE — Clinical Note
Fortunately, Kranthi is asymptomatic, no arrhythmia, but ECG shows new right bundle branch block and LVH.  I think that he is an appropriate candidate for consideration of surgical repair at this time and he is agreeable to CT surgery referral.  Arranged.

## 2024-09-26 LAB
ATRIAL RATE: 61 BPM
P AXIS: 87 DEGREES
P OFFSET: 190 MS
P ONSET: 135 MS
PR INTERVAL: 164 MS
Q ONSET: 217 MS
QRS COUNT: 10 BEATS
QRS DURATION: 136 MS
QT INTERVAL: 420 MS
QTC CALCULATION(BAZETT): 422 MS
QTC FREDERICIA: 422 MS
R AXIS: 90 DEGREES
T AXIS: 55 DEGREES
T OFFSET: 427 MS
VENTRICULAR RATE: 61 BPM

## 2024-10-06 DIAGNOSIS — E78.5 HYPERLIPIDEMIA, UNSPECIFIED: ICD-10-CM

## 2024-10-07 RX ORDER — ATORVASTATIN CALCIUM 80 MG/1
80 TABLET, FILM COATED ORAL DAILY
Qty: 90 TABLET | Refills: 0 | Status: SHIPPED | OUTPATIENT
Start: 2024-10-07

## 2024-10-18 NOTE — H&P (VIEW-ONLY)
-------------------------------------------------------------------------------  Attestation signed by Elio Ku MD at 10/18/2024  6:50 AM    Trauma Acute Care Attending Addendum      I have personally seen and examined this patient with Dr. Tirado. I have personally reviewed pertinent labs and imaging. I agree with the evaluation above with the following additional notes:    Agree. Non-tender on exam. Imaging without signs of cholecystitis and normal wbc. Discussed that the stone at neck appears immobile and as such he is high risk for recurrence. Patient is visiting his family, one of whom is Emed doc at [facility]. Patient is also a physician. The patient would like to discharge home and will return if he develops signs of recurrence    The encounter diagnosis was Biliary colic.      Elio Ku MD FACS  Trauma and Acute Care Surgery  [facility]    -------------------------------------------------------------------------------    Consult Note    Inpatient consult to General Surgery  Consult performed by: Marnie Tirado MD  Consult ordered by: Christy Mclaughlin MD        Assessment/Plan  Active Problems:  No Active Problems: There are no active problems currently on the Problem List. Please update the Problem List and refresh.    78M presenting with <24hrs of RUQ pain that has since improved. No nausea, vomiting, PO intolerance. Labs unremarkable. US with immobile 5mm stone in the neck of the gallbladder, no pericholecystic fluid of gallbladder wall thickening to suggest acute cholecystitis. Discussed increased risk of recurrence of pain and development of acute cholecystitis given findings of immobile stone in gallbladder neck. We discussed options including admission with laparoscopic cholecystectomy tomorrow vs discharge home with outpatient cholecystectomy. Pt expresses understanding and prefers to discharge home at this time.    - No surgical intervention at  this time   - Recommend PO challenge prior to discharge home   - Follow up with surgeon in [place] to discuss cholecystectomy   - Reviewed signs/symptoms that would require re-evaluation in the ED    Pt seen and discussed with attending, Dr. Kings Tirado MD  General Surgery PGY-3  Spectralink [id number]    History of Present Illness:   Willy Cordero is a 78 y.o. male presenting with RUQ abdominal pain that woke him from his sleep last night. He has continued to have pain during the day but reports some improvement, now with very mild pain that he says is positional. Denies fevers, chills, nausea, vomiting. Ate lunch without difficulty today. He has never had similar symptoms. Of note, pt lives in [facility] and is currently visiting family here. He plans to return home on Sunday.     In the ED, VS wnl. Labs unremarkable, no leukocytosis, normal LFTs/t bili. Ultrasound shows immobile 5mm stone in the neck of the gallbladder, no pericholecystic fluid or gallbladder wall thickening. CT A/P also completed with acute intraabdominal pathology, but does show bilateral inguinal hernias which pt states he did not realize he had.     PMH: Hyperlipidemia  PSH: Spine fusion   Meds: Atorvastatin    No past medical history on file.  No past surgical history on file.  No family history on file.  Social History     Socioeconomic History   • Marital status:      Spouse name: Not on file   • Number of children: Not on file   • Years of education: Not on file   • Highest education level: Not on file   Occupational History   • Not on file   Tobacco Use   • Smoking status: Not on file   • Smokeless tobacco: Not on file   Substance and Sexual Activity   • Alcohol use: Not on file   • Drug use: Not on file   • Sexual activity: Not on file   Other Topics Concern   • Not on file   Social History Narrative   • Not on file     Social Determinants of Health     Financial Resource Strain: Not on file   Food Insecurity: Not on file  "  Transportation Needs: Not on file   Physical Activity: Not on file   Stress: Not on file   Social Connections: Not on file   Intimate Partner Violence: Not on file   Housing Stability: Not on file       Allergies:  Allergies   Allergen Reactions   • Sulfa (Sulfonamide Antibiotics)        Medications:  No current facility-administered medications on file prior to encounter.     No current outpatient medications on file prior to encounter.       Review of Systems   Constitutional: Negative.  Negative for chills and fever.   Respiratory: Negative.     Cardiovascular: Negative.    Gastrointestinal:  Positive for abdominal pain. Negative for constipation, diarrhea, nausea and vomiting.   Genitourinary: Negative.    Musculoskeletal: Negative.    All other systems reviewed and are negative.      Vitals: /74 (BP Location: Right arm, Patient Position: Sitting)   Pulse 59   Temp 36.8 °C (98.3 °F) (Tympanic)   Resp 16   Ht 1.778 m (5' 10\")   Wt 73.9 kg (163 lb)   SpO2 98%   BMI 23.39 kg/m²     Physical Exam  Vitals reviewed.   Constitutional:       Appearance: Normal appearance.   Cardiovascular:      Rate and Rhythm: Normal rate.   Pulmonary:      Effort: Pulmonary effort is normal. No respiratory distress.   Abdominal:      General: There is no distension.      Palpations: Abdomen is soft.      Tenderness: There is no abdominal tenderness. There is no guarding or rebound.   Skin:     General: Skin is warm and dry.   Neurological:      General: No focal deficit present.      Mental Status: He is alert and oriented to person, place, and time.   Psychiatric:         Mood and Affect: Mood normal.         Behavior: Behavior normal.         Lines:                 I/O:  No intake or output data in the 24 hours ending 10/18/24 0004    Diagnostic Results:   I have personally reviewed:     Pertinent Labs:  I have personally reviewed:   Results from last 7 days   Lab Units 10/17/24  1843   WBC AUTO 10*3/µL   7.1 "   HEMOGLOBIN g/dL 13.2   HEMATOCRIT % 39.0*   PLATELETS AUTO 10*3/uL 208   SODIUM mmol/L 138   POTASSIUM mmol/L 4.6   CHLORIDE mmol/L 106   CO2 mmol/L 26   BUN mg/dL 19   CREATININE mg/dL 1.06   GLUCOSE mg/dL 97   CALCIUM mg/dL 9.7

## 2024-10-21 DIAGNOSIS — K80.00 CALCULUS OF GALLBLADDER WITH ACUTE CHOLECYSTITIS WITHOUT OBSTRUCTION: Primary | ICD-10-CM

## 2024-10-23 ENCOUNTER — OFFICE VISIT (OUTPATIENT)
Dept: SURGERY | Facility: CLINIC | Age: 79
End: 2024-10-23
Payer: COMMERCIAL

## 2024-10-23 VITALS
HEART RATE: 62 BPM | DIASTOLIC BLOOD PRESSURE: 77 MMHG | SYSTOLIC BLOOD PRESSURE: 189 MMHG | HEIGHT: 71 IN | TEMPERATURE: 97.2 F | BODY MASS INDEX: 23.72 KG/M2 | WEIGHT: 169.4 LBS

## 2024-10-23 DIAGNOSIS — K80.00 CALCULUS OF GALLBLADDER WITH ACUTE CHOLECYSTITIS WITHOUT OBSTRUCTION: ICD-10-CM

## 2024-10-23 DIAGNOSIS — K40.90 RIGHT INGUINAL HERNIA: Primary | ICD-10-CM

## 2024-10-23 PROCEDURE — 1123F ACP DISCUSS/DSCN MKR DOCD: CPT | Performed by: SURGERY

## 2024-10-23 PROCEDURE — 1125F AMNT PAIN NOTED PAIN PRSNT: CPT | Performed by: SURGERY

## 2024-10-23 PROCEDURE — 99214 OFFICE O/P EST MOD 30 MIN: CPT | Performed by: SURGERY

## 2024-10-23 PROCEDURE — 99204 OFFICE O/P NEW MOD 45 MIN: CPT | Performed by: SURGERY

## 2024-10-23 PROCEDURE — 1159F MED LIST DOCD IN RCRD: CPT | Performed by: SURGERY

## 2024-10-23 PROCEDURE — 1036F TOBACCO NON-USER: CPT | Performed by: SURGERY

## 2024-10-23 RX ORDER — CEFAZOLIN SODIUM 2 G/100ML
2 INJECTION, SOLUTION INTRAVENOUS ONCE
OUTPATIENT
Start: 2024-10-23 | End: 2024-10-23

## 2024-10-23 ASSESSMENT — ENCOUNTER SYMPTOMS: DEPRESSION: 0

## 2024-10-23 ASSESSMENT — PAIN SCALES - GENERAL: PAINLEVEL_OUTOF10: 4

## 2024-10-23 NOTE — PROGRESS NOTES
History Of Present Illness  Willy Cordero is a 78 y.o. male presenting with history of right upper quadrant pain radiating to his back.  He was in [place] last week when he went to the emergency room.  Ultrasound demonstrated a stone in the neck of the gallbladder.  They are able to get control of his symptoms and he was able to be discharged.  His primary doctor is asked him to come see us today to discuss long-term plan.  Looks like his liver function tests were normal last week.  CT scan was also done and that interestingly demonstrated a right inguinal hernia containing loops of small bowel.  He was not aware of hernia prior to that point.    Otherwise he is pretty healthy.  He is on medication for hyperlipidemia.  He sees Dr. Khanh Concepcion for mitral regurgitation.  No prior abdominal surgeries.    He lives with his wife in [place].  Non-smoker.  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      Cataract     Heart disease     Heart murmur     HL (hearing loss)     Insomnia 07/30/2024    Scoliosis        Surgical History  Past Surgical History:   Procedure Laterality Date    BACK SURGERY      LUMBAR LAMINECTOMY  07/04/2017    Laminectomy Lumbar    VASECTOMY          Social History  He reports that he has never smoked. He has never used smokeless tobacco. He reports current alcohol use of about 10.0 standard drinks of alcohol per week. He reports that he does not use drugs.    Family History  Family History   Problem Relation Name Age of Onset    Diabetes Mother Caryl Cordero     Hearing loss Mother Caryl Cordero     Heart disease Father Spenser Cordero     Breast cancer Sister   "Herget         Allergies  Sulfa (sulfonamide antibiotics)    Review of Systems  Constitutional: no weight loss, no fevers, no malaise  HEENT: negative  Neck: negative  Pulmonary: no SOB, no cough  CV: no chest pain, otherwise negative  GI: See HPI  : no hematuria, retention.  MS: no aches/pains  Neurologic: negative  Skin: no rashes, lesions  HEME: no bleeding tendency, no bruising  Psych: no mood issues    Physical Exam  Constitutional: NAD.  Alert and oriented x 3.    Skin: non jaundiced  HEENT: normal  Lungs: clear to auscultation  CV: RRR, S1S2, no murmurs  Abdomen: soft, mild right upper quadrant tenderness with inspiration.  Reducible, mildly tender right inguinal hernia.  Left side intact  : testicles normal.  MS: grossly normal  Neuro: grossly normal.    Last Recorded Vitals  Blood pressure (!) 189/77, pulse 62, temperature 36.2 °C (97.2 °F), temperature source Temporal, height 1.803 m (5' 11\"), weight 76.8 kg (169 lb 6.4 oz).    Relevant Results    1. No CT evidence of acute intra-abdominal or pelvic pathology.   2. Small fat-containing bilateral inguinal hernias with mild protrusion of small bowel loops into the right inguinal canal.     PQRS:      Electronically signed by Jodie Carrion MD on 10/17/2024 9:35 PM       US abdomen complete  Order: [id number]  Impression    IMPRESSION:    1.  Immobile nonshadowing immobile echogenic focus measuring 5 mm in the neck of gallbladder no evidence of pericholecystic fluid or gallbladder wall thickening.    PQRS:     Electronically signed by Jodie Carrion MD on 10/17/2024 9:54 PM  Narrative       Assessment/Plan       Impression:  Symptomatic Cholelithiasis.  He also has a right inguinal hernia concerning the containing loops of small bowel.    -I carefully explained the pathophysiology of biliary tract disease using terms and pictures  -Rationale for surgical intervention described  -Technique of laparoscopic cholecystectomy explained (both " standard and robotic assisted)  -Risks of surgery elucidated (bleeding, infection, bile leak, CBD injury, etc)  -Other options presented (watchful waiting, avoidance of fatty foods)  -We also discussed fixing his inguinal hernia concomitantly.  I told him given the findings of bowel within the hernia would be reasonable to have this repaired.  Whether it can be safely done at the same time as gallbladder surgery depends on degree of inflammation/infection encountered at the time of the gallbladder operation.  If it is a routine clean case I think fixing the hernia would be a reasonable thing to do.  -All patient questions answered to his satisfaction.  -Patient has indicated desire to pursue surgical intervention  -Office will arrange at patient convenience.       I spent 45 minutes in the professional and overall care of this patient.      Jamil Colmenares MD

## 2024-10-23 NOTE — LETTER
October 23, 2024     Tyson Lr MD  [address] [facility] [address]    Patient: Willy Cordero   YOB: 1945   Date of Visit: 10/23/2024       Dear Dr. Tyson Lr MD:    Thank you for referring Willy Cordero to me for evaluation. Below are my notes for this consultation.  If you have questions, please do not hesitate to call me. I look forward to following your patient along with you.       Sincerely,     Jamil Colmenares MD      CC: No Recipients  ______________________________________________________________________________________    History Of Present Illness  Willy Cordero is a 78 y.o. male presenting with history of right upper quadrant pain radiating to his back.  He was in [place] last week when he went to the emergency room.  Ultrasound demonstrated a stone in the neck of the gallbladder.  They are able to get control of his symptoms and he was able to be discharged.  His primary doctor is asked him to come see us today to discuss long-term plan.  Looks like his liver function tests were normal last week.  CT scan was also done and that interestingly demonstrated a right inguinal hernia containing loops of small bowel.  He was not aware of hernia prior to that point.    Otherwise he is pretty healthy.  He is on medication for hyperlipidemia.  He sees Dr. Khanh Concepcion for mitral regurgitation.  No prior abdominal surgeries.    He lives with his wife in [place].  Non-smoker.  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     • Cataract    • Heart disease    • Heart murmur    • HL (hearing loss)    • Insomnia 07/30/2024   • Scoliosis   "      Surgical History  Past Surgical History:   Procedure Laterality Date   • BACK SURGERY     • LUMBAR LAMINECTOMY  07/04/2017    Laminectomy Lumbar   • VASECTOMY          Social History  He reports that he has never smoked. He has never used smokeless tobacco. He reports current alcohol use of about 10.0 standard drinks of alcohol per week. He reports that he does not use drugs.    Family History  Family History   Problem Relation Name Age of Onset   • Diabetes Mother Caryl Cordero    • Hearing loss Mother Caryl Cordero    • Heart disease Father Spenser Cordero    • Breast cancer Sister Jane Rubi         Allergies  Sulfa (sulfonamide antibiotics)    Review of Systems  Constitutional: no weight loss, no fevers, no malaise  HEENT: negative  Neck: negative  Pulmonary: no SOB, no cough  CV: no chest pain, otherwise negative  GI: See HPI  : no hematuria, retention.  MS: no aches/pains  Neurologic: negative  Skin: no rashes, lesions  HEME: no bleeding tendency, no bruising  Psych: no mood issues    Physical Exam  Constitutional: NAD.  Alert and oriented x 3.    Skin: non jaundiced  HEENT: normal  Lungs: clear to auscultation  CV: RRR, S1S2, no murmurs  Abdomen: soft, mild right upper quadrant tenderness with inspiration.  Reducible, mildly tender right inguinal hernia.  Left side intact  : testicles normal.  MS: grossly normal  Neuro: grossly normal.    Last Recorded Vitals  Blood pressure (!) 189/77, pulse 62, temperature 36.2 °C (97.2 °F), temperature source Temporal, height 1.803 m (5' 11\"), weight 76.8 kg (169 lb 6.4 oz).    Relevant Results    1. No CT evidence of acute intra-abdominal or pelvic pathology.   2. Small fat-containing bilateral inguinal hernias with mild protrusion of small bowel loops into the right inguinal canal.     PQRS:      Electronically signed by Jodie Carrion MD on 10/17/2024 9:35 PM       US abdomen complete  Order: [id number]  Impression    IMPRESSION:    1.  Immobile " nonshadowing immobile echogenic focus measuring 5 mm in the neck of gallbladder no evidence of pericholecystic fluid or gallbladder wall thickening.    PQRS:     Electronically signed by Jodie Carrion MD on 10/17/2024 9:54 PM  Narrative       Assessment/Plan       Impression:  Symptomatic Cholelithiasis.  He also has a right inguinal hernia concerning the containing loops of small bowel.    -I carefully explained the pathophysiology of biliary tract disease using terms and pictures  -Rationale for surgical intervention described  -Technique of laparoscopic cholecystectomy explained (both standard and robotic assisted)  -Risks of surgery elucidated (bleeding, infection, bile leak, CBD injury, etc)  -Other options presented (watchful waiting, avoidance of fatty foods)  -We also discussed fixing his inguinal hernia concomitantly.  I told him given the findings of bowel within the hernia would be reasonable to have this repaired.  Whether it can be safely done at the same time as gallbladder surgery depends on degree of inflammation/infection encountered at the time of the gallbladder operation.  If it is a routine clean case I think fixing the hernia would be a reasonable thing to do.  -All patient questions answered to his satisfaction.  -Patient has indicated desire to pursue surgical intervention  -Office will arrange at patient convenience.       I spent 45 minutes in the professional and overall care of this patient.      Jamil Colmenares MD

## 2024-10-24 ENCOUNTER — TELEPHONE (OUTPATIENT)
Dept: SCHEDULING | Age: 79
End: 2024-10-24
Payer: COMMERCIAL

## 2024-10-25 ENCOUNTER — CLINICAL SUPPORT (OUTPATIENT)
Dept: PREADMISSION TESTING | Facility: HOSPITAL | Age: 79
End: 2024-10-25
Payer: COMMERCIAL

## 2024-10-25 ENCOUNTER — APPOINTMENT (OUTPATIENT)
Dept: CARDIAC SURGERY | Facility: HOSPITAL | Age: 79
End: 2024-10-25
Payer: COMMERCIAL

## 2024-10-25 DIAGNOSIS — K80.00 CALCULUS OF GALLBLADDER WITH ACUTE CHOLECYSTITIS WITHOUT OBSTRUCTION: ICD-10-CM

## 2024-10-25 DIAGNOSIS — K40.90 RIGHT INGUINAL HERNIA: ICD-10-CM

## 2024-10-29 ENCOUNTER — PRE-ADMISSION TESTING (OUTPATIENT)
Dept: PREADMISSION TESTING | Facility: HOSPITAL | Age: 79
End: 2024-10-29
Payer: COMMERCIAL

## 2024-10-29 VITALS
TEMPERATURE: 98.6 F | DIASTOLIC BLOOD PRESSURE: 54 MMHG | HEART RATE: 55 BPM | BODY MASS INDEX: 23.36 KG/M2 | OXYGEN SATURATION: 98 % | HEIGHT: 70 IN | SYSTOLIC BLOOD PRESSURE: 131 MMHG | WEIGHT: 163.14 LBS | RESPIRATION RATE: 16 BRPM

## 2024-10-29 DIAGNOSIS — Z01.818 PREOP TESTING: Primary | ICD-10-CM

## 2024-10-29 PROCEDURE — 99204 OFFICE O/P NEW MOD 45 MIN: CPT | Performed by: PHYSICIAN ASSISTANT

## 2024-10-29 PROCEDURE — 87081 CULTURE SCREEN ONLY: CPT | Mod: AHULAB | Performed by: PHYSICIAN ASSISTANT

## 2024-10-29 RX ORDER — ACETAMINOPHEN 500 MG
500 TABLET ORAL EVERY 6 HOURS PRN
COMMUNITY

## 2024-10-29 RX ORDER — CHLORHEXIDINE GLUCONATE ORAL RINSE 1.2 MG/ML
SOLUTION DENTAL
Qty: 473 ML | Refills: 0 | Status: SHIPPED | OUTPATIENT
Start: 2024-10-29

## 2024-10-29 ASSESSMENT — ENCOUNTER SYMPTOMS
ENDOCRINE NEGATIVE: 1
PSYCHIATRIC NEGATIVE: 1
GASTROINTESTINAL NEGATIVE: 1
CONSTITUTIONAL NEGATIVE: 1
HEMATOLOGIC/LYMPHATIC NEGATIVE: 1
CARDIOVASCULAR NEGATIVE: 1
ALLERGIC/IMMUNOLOGIC NEGATIVE: 1
MUSCULOSKELETAL NEGATIVE: 1
RESPIRATORY NEGATIVE: 1
NEUROLOGICAL NEGATIVE: 1
EYES NEGATIVE: 1

## 2024-10-31 LAB — STAPHYLOCOCCUS SPEC CULT: NORMAL

## 2024-11-01 ENCOUNTER — OFFICE VISIT (OUTPATIENT)
Dept: CARDIOLOGY | Facility: HOSPITAL | Age: 79
End: 2024-11-01
Payer: COMMERCIAL

## 2024-11-01 ENCOUNTER — APPOINTMENT (OUTPATIENT)
Dept: CARDIAC SURGERY | Facility: HOSPITAL | Age: 79
End: 2024-11-01
Payer: COMMERCIAL

## 2024-11-01 VITALS
OXYGEN SATURATION: 98 % | WEIGHT: 165.6 LBS | SYSTOLIC BLOOD PRESSURE: 167 MMHG | DIASTOLIC BLOOD PRESSURE: 77 MMHG | BODY MASS INDEX: 23.71 KG/M2 | HEART RATE: 54 BPM | HEIGHT: 70 IN

## 2024-11-01 DIAGNOSIS — I07.1 SEVERE TRICUSPID REGURGITATION: ICD-10-CM

## 2024-11-01 DIAGNOSIS — I34.0 SEVERE MITRAL REGURGITATION: Primary | ICD-10-CM

## 2024-11-01 DIAGNOSIS — K80.10 CALCULUS OF GALLBLADDER WITH CHOLECYSTITIS WITHOUT BILIARY OBSTRUCTION, UNSPECIFIED CHOLECYSTITIS ACUITY: ICD-10-CM

## 2024-11-01 PROCEDURE — 99213 OFFICE O/P EST LOW 20 MIN: CPT | Performed by: INTERNAL MEDICINE

## 2024-11-01 ASSESSMENT — PAIN SCALES - GENERAL: PAINLEVEL_OUTOF10: 0-NO PAIN

## 2024-11-05 ENCOUNTER — TELEMEDICINE (OUTPATIENT)
Dept: CARDIAC SURGERY | Facility: HOSPITAL | Age: 79
End: 2024-11-05
Payer: COMMERCIAL

## 2024-11-05 DIAGNOSIS — I34.0 NONRHEUMATIC MITRAL VALVE REGURGITATION: Primary | ICD-10-CM

## 2024-11-05 DIAGNOSIS — I34.0 MITRAL VALVE INSUFFICIENCY, UNSPECIFIED ETIOLOGY: Primary | ICD-10-CM

## 2024-11-05 PROCEDURE — 99203 OFFICE O/P NEW LOW 30 MIN: CPT | Performed by: THORACIC SURGERY (CARDIOTHORACIC VASCULAR SURGERY)

## 2024-11-05 PROCEDURE — 1123F ACP DISCUSS/DSCN MKR DOCD: CPT | Performed by: THORACIC SURGERY (CARDIOTHORACIC VASCULAR SURGERY)

## 2024-11-05 NOTE — PROGRESS NOTES
This was a virtual visit over audio and video    It was a pleasure to meet him today.  In summary he is 78 years of age who is undergoing regular follow-up for mitral regurgitation.  He was referred for assessment for mitral valve intervention with worsening severity of his mitral regurgitation, and elevation in his pulmonary artery pressures.  On questioning he has little in the way of symptoms specifically no shortness of breath on exertion.  He does have some back pain due to back trouble from the past.  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    He has little in the way of past medical history.      His height is 5 foot 9 inches and his weight is 165 pounds    He is under going cholecystectomy at [facility] tomorrow for gallstone disease.    His transthoracic echocardiogram revealed preserved left ventricular function with severe mitral regurgitation and bileaflet prolapse.  He also has moderate severe tricuspid regurgitation.  Right ventricular systolic pressure was 45 mmHg.    Assessment and plan    78-year-old gentleman who remains active with severe mitral regurgitation.  He requires further workup with transesophageal echocardiogram as well as coronary angiography.  He will also require CT angiography to assess his suitability for surgical approach.  We felt that surgery will be in his best interest with the possibility of minimally invasive surgery through a right minithoracotomy depending on the remaining workup.    I discussed this with him in detail and he is happy to proceed.  He requested to have a date in January due to some family commitments.    I look forward to completing his workup and bringing him in for surgery and will keep you informed of his progress.    Thank you for involving me in his care    Don Sung MD PhD

## 2024-11-06 ENCOUNTER — ANESTHESIA (OUTPATIENT)
Dept: OPERATING ROOM | Facility: HOSPITAL | Age: 79
End: 2024-11-06
Payer: COMMERCIAL

## 2024-11-06 ENCOUNTER — ANESTHESIA EVENT (OUTPATIENT)
Dept: OPERATING ROOM | Facility: HOSPITAL | Age: 79
End: 2024-11-06
Payer: COMMERCIAL

## 2024-11-06 ENCOUNTER — HOSPITAL ENCOUNTER (OUTPATIENT)
Facility: HOSPITAL | Age: 79
Setting detail: OUTPATIENT SURGERY
Discharge: HOME | End: 2024-11-06
Attending: SURGERY | Admitting: SURGERY
Payer: COMMERCIAL

## 2024-11-06 ENCOUNTER — APPOINTMENT (OUTPATIENT)
Dept: RADIOLOGY | Facility: HOSPITAL | Age: 79
End: 2024-11-06
Payer: COMMERCIAL

## 2024-11-06 VITALS
RESPIRATION RATE: 18 BRPM | WEIGHT: 166.01 LBS | OXYGEN SATURATION: 97 % | HEIGHT: 70 IN | TEMPERATURE: 98.1 F | SYSTOLIC BLOOD PRESSURE: 153 MMHG | BODY MASS INDEX: 23.77 KG/M2 | DIASTOLIC BLOOD PRESSURE: 78 MMHG | HEART RATE: 55 BPM

## 2024-11-06 DIAGNOSIS — K80.00 CALCULUS OF GALLBLADDER WITH ACUTE CHOLECYSTITIS WITHOUT OBSTRUCTION: ICD-10-CM

## 2024-11-06 DIAGNOSIS — K40.90 RIGHT INGUINAL HERNIA: Primary | ICD-10-CM

## 2024-11-06 PROCEDURE — 2550000001 HC RX 255 CONTRASTS: Performed by: SURGERY

## 2024-11-06 PROCEDURE — A47563 PR LAP,CHOLECYSTECTOMY/GRAPH

## 2024-11-06 PROCEDURE — 3700000002 HC GENERAL ANESTHESIA TIME - EACH INCREMENTAL 1 MINUTE: Performed by: SURGERY

## 2024-11-06 PROCEDURE — 2500000004 HC RX 250 GENERAL PHARMACY W/ HCPCS (ALT 636 FOR OP/ED)

## 2024-11-06 PROCEDURE — 3600000003 HC OR TIME - INITIAL BASE CHARGE - PROCEDURE LEVEL THREE: Performed by: SURGERY

## 2024-11-06 PROCEDURE — 2720000007 HC OR 272 NO HCPCS: Performed by: SURGERY

## 2024-11-06 PROCEDURE — 7100000001 HC RECOVERY ROOM TIME - INITIAL BASE CHARGE: Performed by: SURGERY

## 2024-11-06 PROCEDURE — 7100000002 HC RECOVERY ROOM TIME - EACH INCREMENTAL 1 MINUTE: Performed by: SURGERY

## 2024-11-06 PROCEDURE — 7100000009 HC PHASE TWO TIME - INITIAL BASE CHARGE: Performed by: SURGERY

## 2024-11-06 PROCEDURE — 2500000004 HC RX 250 GENERAL PHARMACY W/ HCPCS (ALT 636 FOR OP/ED): Performed by: ANESTHESIOLOGY

## 2024-11-06 PROCEDURE — 88304 TISSUE EXAM BY PATHOLOGIST: CPT | Mod: TC,AHULAB | Performed by: SURGERY

## 2024-11-06 PROCEDURE — 7100000010 HC PHASE TWO TIME - EACH INCREMENTAL 1 MINUTE: Performed by: SURGERY

## 2024-11-06 PROCEDURE — 47563 LAPARO CHOLECYSTECTOMY/GRAPH: CPT | Performed by: SURGERY

## 2024-11-06 PROCEDURE — 99100 ANES PT EXTEME AGE<1 YR&>70: CPT | Performed by: ANESTHESIOLOGY

## 2024-11-06 PROCEDURE — 2500000001 HC RX 250 WO HCPCS SELF ADMINISTERED DRUGS (ALT 637 FOR MEDICARE OP): Performed by: ANESTHESIOLOGY

## 2024-11-06 PROCEDURE — A47563 PR LAP,CHOLECYSTECTOMY/GRAPH: Performed by: ANESTHESIOLOGY

## 2024-11-06 PROCEDURE — 3600000008 HC OR TIME - EACH INCREMENTAL 1 MINUTE - PROCEDURE LEVEL THREE: Performed by: SURGERY

## 2024-11-06 PROCEDURE — 2500000005 HC RX 250 GENERAL PHARMACY W/O HCPCS

## 2024-11-06 PROCEDURE — 2500000005 HC RX 250 GENERAL PHARMACY W/O HCPCS: Performed by: ANESTHESIOLOGY

## 2024-11-06 PROCEDURE — 74300 X-RAY BILE DUCTS/PANCREAS: CPT

## 2024-11-06 PROCEDURE — 3700000001 HC GENERAL ANESTHESIA TIME - INITIAL BASE CHARGE: Performed by: SURGERY

## 2024-11-06 PROCEDURE — 36620 INSERTION CATHETER ARTERY: CPT | Performed by: ANESTHESIOLOGY

## 2024-11-06 PROCEDURE — 2500000005 HC RX 250 GENERAL PHARMACY W/O HCPCS: Performed by: SURGERY

## 2024-11-06 PROCEDURE — 2500000004 HC RX 250 GENERAL PHARMACY W/ HCPCS (ALT 636 FOR OP/ED): Performed by: SURGERY

## 2024-11-06 RX ORDER — CEFAZOLIN 1 G/1
INJECTION, POWDER, FOR SOLUTION INTRAVENOUS AS NEEDED
Status: DISCONTINUED | OUTPATIENT
Start: 2024-11-06 | End: 2024-11-06

## 2024-11-06 RX ORDER — IBUPROFEN 600 MG/1
600 TABLET ORAL EVERY 6 HOURS PRN
Qty: 20 TABLET | Refills: 0 | Status: SHIPPED | OUTPATIENT
Start: 2024-11-06

## 2024-11-06 RX ORDER — MIDAZOLAM HYDROCHLORIDE 1 MG/ML
INJECTION INTRAMUSCULAR; INTRAVENOUS AS NEEDED
Status: DISCONTINUED | OUTPATIENT
Start: 2024-11-06 | End: 2024-11-06

## 2024-11-06 RX ORDER — HYDROMORPHONE HYDROCHLORIDE 1 MG/ML
INJECTION, SOLUTION INTRAMUSCULAR; INTRAVENOUS; SUBCUTANEOUS AS NEEDED
Status: DISCONTINUED | OUTPATIENT
Start: 2024-11-06 | End: 2024-11-06

## 2024-11-06 RX ORDER — POLYETHYLENE GLYCOL 3350 17 G/17G
17 POWDER, FOR SOLUTION ORAL DAILY PRN
Qty: 10 PACKET | Refills: 0 | Status: SHIPPED | OUTPATIENT
Start: 2024-11-06

## 2024-11-06 RX ORDER — NORETHINDRONE AND ETHINYL ESTRADIOL 0.5-0.035
KIT ORAL AS NEEDED
Status: DISCONTINUED | OUTPATIENT
Start: 2024-11-06 | End: 2024-11-06

## 2024-11-06 RX ORDER — FENTANYL CITRATE 50 UG/ML
25 INJECTION, SOLUTION INTRAMUSCULAR; INTRAVENOUS EVERY 5 MIN PRN
Status: DISCONTINUED | OUTPATIENT
Start: 2024-11-06 | End: 2024-11-06 | Stop reason: HOSPADM

## 2024-11-06 RX ORDER — SODIUM CHLORIDE, SODIUM LACTATE, POTASSIUM CHLORIDE, CALCIUM CHLORIDE 600; 310; 30; 20 MG/100ML; MG/100ML; MG/100ML; MG/100ML
INJECTION, SOLUTION INTRAVENOUS CONTINUOUS PRN
Status: DISCONTINUED | OUTPATIENT
Start: 2024-11-06 | End: 2024-11-06

## 2024-11-06 RX ORDER — DROPERIDOL 2.5 MG/ML
0.62 INJECTION, SOLUTION INTRAMUSCULAR; INTRAVENOUS ONCE AS NEEDED
Status: DISCONTINUED | OUTPATIENT
Start: 2024-11-06 | End: 2024-11-06 | Stop reason: HOSPADM

## 2024-11-06 RX ORDER — FENTANYL CITRATE 50 UG/ML
12.5 INJECTION, SOLUTION INTRAMUSCULAR; INTRAVENOUS EVERY 5 MIN PRN
Status: DISCONTINUED | OUTPATIENT
Start: 2024-11-06 | End: 2024-11-06 | Stop reason: HOSPADM

## 2024-11-06 RX ORDER — PROPOFOL 10 MG/ML
INJECTION, EMULSION INTRAVENOUS AS NEEDED
Status: DISCONTINUED | OUTPATIENT
Start: 2024-11-06 | End: 2024-11-06

## 2024-11-06 RX ORDER — LIDOCAINE HYDROCHLORIDE 10 MG/ML
0.1 INJECTION, SOLUTION EPIDURAL; INFILTRATION; INTRACAUDAL; PERINEURAL ONCE
Status: DISCONTINUED | OUTPATIENT
Start: 2024-11-06 | End: 2024-11-06 | Stop reason: HOSPADM

## 2024-11-06 RX ORDER — OXYCODONE HYDROCHLORIDE 5 MG/1
5 TABLET ORAL ONCE
Status: COMPLETED | OUTPATIENT
Start: 2024-11-06 | End: 2024-11-06

## 2024-11-06 RX ORDER — CEFAZOLIN SODIUM 2 G/100ML
2 INJECTION, SOLUTION INTRAVENOUS ONCE
Status: DISCONTINUED | OUTPATIENT
Start: 2024-11-06 | End: 2024-11-06 | Stop reason: HOSPADM

## 2024-11-06 RX ORDER — FENTANYL CITRATE 50 UG/ML
50 INJECTION, SOLUTION INTRAMUSCULAR; INTRAVENOUS EVERY 5 MIN PRN
Status: DISCONTINUED | OUTPATIENT
Start: 2024-11-06 | End: 2024-11-06 | Stop reason: HOSPADM

## 2024-11-06 RX ORDER — LIDOCAINE HYDROCHLORIDE 20 MG/ML
INJECTION, SOLUTION EPIDURAL; INFILTRATION; INTRACAUDAL; PERINEURAL AS NEEDED
Status: DISCONTINUED | OUTPATIENT
Start: 2024-11-06 | End: 2024-11-06

## 2024-11-06 RX ORDER — BUPIVACAINE HYDROCHLORIDE 5 MG/ML
INJECTION, SOLUTION PERINEURAL AS NEEDED
Status: DISCONTINUED | OUTPATIENT
Start: 2024-11-06 | End: 2024-11-06 | Stop reason: HOSPADM

## 2024-11-06 RX ORDER — SODIUM CHLORIDE 0.9 G/100ML
IRRIGANT IRRIGATION AS NEEDED
Status: DISCONTINUED | OUTPATIENT
Start: 2024-11-06 | End: 2024-11-06 | Stop reason: HOSPADM

## 2024-11-06 RX ORDER — ROCURONIUM BROMIDE 10 MG/ML
INJECTION, SOLUTION INTRAVENOUS AS NEEDED
Status: DISCONTINUED | OUTPATIENT
Start: 2024-11-06 | End: 2024-11-06

## 2024-11-06 RX ORDER — FENTANYL CITRATE 50 UG/ML
INJECTION, SOLUTION INTRAMUSCULAR; INTRAVENOUS AS NEEDED
Status: DISCONTINUED | OUTPATIENT
Start: 2024-11-06 | End: 2024-11-06

## 2024-11-06 RX ORDER — SODIUM CHLORIDE, SODIUM LACTATE, POTASSIUM CHLORIDE, CALCIUM CHLORIDE 600; 310; 30; 20 MG/100ML; MG/100ML; MG/100ML; MG/100ML
100 INJECTION, SOLUTION INTRAVENOUS CONTINUOUS
Status: DISCONTINUED | OUTPATIENT
Start: 2024-11-06 | End: 2024-11-06 | Stop reason: HOSPADM

## 2024-11-06 RX ORDER — OXYCODONE HYDROCHLORIDE 5 MG/1
5 TABLET ORAL EVERY 6 HOURS PRN
Qty: 12 TABLET | Refills: 0 | Status: SHIPPED | OUTPATIENT
Start: 2024-11-06

## 2024-11-06 RX ORDER — ONDANSETRON HYDROCHLORIDE 2 MG/ML
INJECTION, SOLUTION INTRAVENOUS AS NEEDED
Status: DISCONTINUED | OUTPATIENT
Start: 2024-11-06 | End: 2024-11-06

## 2024-11-06 RX ORDER — ONDANSETRON HYDROCHLORIDE 2 MG/ML
4 INJECTION, SOLUTION INTRAVENOUS ONCE AS NEEDED
Status: DISCONTINUED | OUTPATIENT
Start: 2024-11-06 | End: 2024-11-06 | Stop reason: HOSPADM

## 2024-11-06 ASSESSMENT — PAIN SCALES - GENERAL
PAINLEVEL_OUTOF10: 4
PAINLEVEL_OUTOF10: 7
PAINLEVEL_OUTOF10: 4
PAINLEVEL_OUTOF10: 3
PAINLEVEL_OUTOF10: 4
PAINLEVEL_OUTOF10: 4
PAIN_LEVEL: 0
PAINLEVEL_OUTOF10: 4
PAINLEVEL_OUTOF10: 3
PAINLEVEL_OUTOF10: 3
PAINLEVEL_OUTOF10: 4
PAINLEVEL_OUTOF10: 7

## 2024-11-06 ASSESSMENT — COLUMBIA-SUICIDE SEVERITY RATING SCALE - C-SSRS
6. HAVE YOU EVER DONE ANYTHING, STARTED TO DO ANYTHING, OR PREPARED TO DO ANYTHING TO END YOUR LIFE?: NO
2. HAVE YOU ACTUALLY HAD ANY THOUGHTS OF KILLING YOURSELF?: NO
1. IN THE PAST MONTH, HAVE YOU WISHED YOU WERE DEAD OR WISHED YOU COULD GO TO SLEEP AND NOT WAKE UP?: NO

## 2024-11-06 NOTE — PERIOPERATIVE NURSING NOTE
1205 Arrival to PACU. Drowsy but easily arousable. Complaining of abdominal discomfort. Lap sites x3 to abdomen sealed with skin glue cdi.    1235 Tolerating PO. Right radial arterial line removed and pressure held until bleeding stopped. Pressure dressing applied.

## 2024-11-06 NOTE — ANESTHESIA POSTPROCEDURE EVALUATION
Patient: Willy Cordero    Procedure Summary       Date: 11/06/24 Room / Location: U A OR 04 / Virtual [facility] OR    Anesthesia Start: 1057 Anesthesia Stop: 1212    Procedure: Laparoscopic Cholecystectomy; Cholangiogram (Abdomen) Diagnosis:       Calculus of gallbladder with acute cholecystitis without obstruction      Right inguinal hernia      (Calculus of gallbladder with acute cholecystitis without obstruction [K80.00])      (Right inguinal hernia [K40.90])    Surgeons: Jamil Colmenares MD Responsible Provider: Alok Hi MD    Anesthesia Type: general ASA Status: 4            Anesthesia Type: general    Vitals Value Taken Time   /75 11/06/24 1216   Temp 36.0 11/06/24 1217   Pulse 49 11/06/24 1216   Resp 16 11/06/24 1217   SpO2 97 % 11/06/24 1216   Vitals shown include unfiled device data.    Anesthesia Post Evaluation    Patient location during evaluation: PACU  Patient participation: complete - patient participated  Level of consciousness: awake and alert  Pain score: 0  Pain management: satisfactory to patient  Airway patency: patent  Cardiovascular status: stable  Respiratory status: room air  Hydration status: stable  Postoperative Nausea and Vomiting: none        No notable events documented.

## 2024-11-06 NOTE — ANESTHESIA PROCEDURE NOTES
Arterial Line:    Date/Time: 11/6/2024 11:06 AM    Staffing  Performed: CAA   Authorized by: Alok Hi MD    Performed by: ELBA Montiel    An arterial line was placed. Procedure performed using surface landmarks.in the OR for the following indication(s): continuous blood pressure monitoring.    A 20 gauge (size), 5 cm (length), Arrow (type) catheter was placed into the Right radial artery, secured by Tegaderm,   Seldinger technique used.  Events:  patient tolerated procedure well with no complications.

## 2024-11-06 NOTE — ANESTHESIA PROCEDURE NOTES
Airway  Date/Time: 11/6/2024 11:13 AM  Urgency: elective    Airway not difficult    Staffing  Performed: TAINA   Authorized by: Alok Hi MD    Performed by: ELBA Montiel  Patient location during procedure: OR    Indications and Patient Condition  Indications for airway management: anesthesia and airway protection  Spontaneous Ventilation: absent  Sedation level: deep  Preoxygenated: yes  Patient position: sniffing  MILS not maintained throughout  Mask difficulty assessment: 1 - vent by mask  Planned trial extubation    Final Airway Details  Final airway type: endotracheal airway      Successful airway: ETT  Cuffed: yes   Successful intubation technique: direct laryngoscopy  Facilitating devices/methods: intubating stylet  Endotracheal tube insertion site: oral  Blade: Zita  Blade size: #4  ETT size (mm): 7.5  Cormack-Lehane Classification: grade I - full view of glottis  Placement verified by: chest auscultation and capnometry   Measured from: lips  ETT to lips (cm): 23  Number of attempts at approach: 1

## 2024-11-06 NOTE — PERIOPERATIVE NURSING NOTE
1428 Discharge instructions provided to pt and family. Educated on medications, effects of anesthesia, and homecoming care. Pt and family verbalizing understanding of all instructions provided at this time. All questions and concerns answered. Pt given contact information for provider.     [id number] wife assisting with dressing

## 2024-11-06 NOTE — ANESTHESIA PREPROCEDURE EVALUATION
Patient: Willy Cordero    Procedure Information       Anesthesia Start Date/Time: 11/06/24 1057    Procedures:       Laparoscopic Cholecystectomy; Cholangiogram (Abdomen)      Laparoscopic Right Inguinal Hernia Repair; Mesh Placement (Right: Abdomen)    Location: U A OR 04 / [facility] A OR    Surgeons: Jamil Colmenares MD            Relevant Problems   Cardiac   (+) Hyperlipidemia   (+) Mitral valve prolapse      GI   (+) Gastroesophageal reflux disease      Liver   (+) Calculus of gallbladder with acute cholecystitis without obstruction      Musculoskeletal   (+) Lumbar degenerative disc disease       Clinical information reviewed:   Tobacco  Allergies  Meds   Med Hx  Surg Hx   Fam Hx  Soc Hx        NPO Detail:  NPO/Void Status  Date of Last Liquid: 11/05/24  Time of Last Liquid: 2200  Date of Last Solid: 11/05/24  Time of Last Solid: 2000         Physical Exam    Airway  Mallampati: II  TM distance: >3 FB  Neck ROM: full     Cardiovascular    Dental    Pulmonary    Abdominal          Anesthesia Plan    History of general anesthesia?: yes  History of complications of general anesthesia?: no    ASA 4     general   (Pt has known severe MR with LAE. Being evaluated for valve repair/ replacement procedure. After discussion with the patient and the GI surgeon , we will proceed with preinduction Viky.)  intravenous induction   Anesthetic plan and risks discussed with patient.    Plan discussed with CAA.

## 2024-11-06 NOTE — OP NOTE
Laparoscopic Cholecystectomy; Cholangiogram Operative Note     Date: 2024  OR Location: [facility] OR    Name: Willy Cordero, : 1945, Age: 78 y.o., MRN: [id number], Sex: male    Diagnosis  Pre-op Diagnosis      * Calculus of gallbladder with acute cholecystitis without obstruction [K80.00]     * Right inguinal hernia [K40.90] Post-op Diagnosis     * Calculus of gallbladder with acute cholecystitis without obstruction [K80.00]     * Right inguinal hernia [K40.90]     Procedures  Laparoscopic Cholecystectomy; Cholangiogram  08007 - VT LAPS SURG CHOLECYSTECTOMY W/CHOLANGIOGRAPHY      Surgeons      * Jamil Colmenares - Primary    Resident/Fellow/Other Assistant:  Surgeons and Role:  * No surgeons found with a matching role *  PGY 5  Staff:   Circulator: Maria Luz Wan Person: Aicha  Surgical Assistant: Faisal Kearney Circulator: Luz    Anesthesia Staff: Anesthesiologist: Alok Hi MD  C-AA: ELBA Montiel; ELBA Kenyon    Procedure Summary  Anesthesia: General  ASA: IV  Estimated Blood Loss: 5mL  Intra-op Medications:   Administrations occurring from 1030 to 1230 on 24:   Medication Name Total Dose   BUPivacaine HCl (Marcaine) 0.5 % (5 mg/mL) injection 13 mL   sodium chloride 0.9 % irrigation solution 200 mL   ioversol (Optiray-320) injection 5 mL   fentaNYL PF (Sublimaze) injection 50 mcg 50 mcg   ceFAZolin (Ancef) vial 1 g 2 g   ePHEDrine injection 5 mg   fentaNYL (Sublimaze) injection 50 mcg/mL 100 mcg   HYDROmorphone (Dilaudid) injection 1 mg/mL 1 mg   LR bolus Cannot be calculated   LR infusion 59.17 mL   lidocaine PF (Xylocaine-MPF) local injection 2 % 100 mg   midazolam PF (Versed) injection 1 mg/mL 2 mg   ondansetron (Zofran) 2 mg/mL injection 4 mg   propofol (Diprivan) injection 10 mg/mL 200 mg   rocuronium (ZeMuron) 50 mg/5 mL injection 100 mg   sugammadex (Bridion) 200 mg/2 mL injection 400 mg              Anesthesia Record               Intraprocedure I/O Totals        None           Specimen:   ID Type Source Tests Collected by Time   1 : GALLBLADDER Tissue GALLBLADDER CHOLECYSTECTOMY SURGICAL PATHOLOGY EXAM Jamil Colmenares MD 11/6/2024 1142                 Drains and/or Catheters: * None in log *    Tourniquet Times:         Implants:     Findings: Chronic cholecystitis.  Normal cholangiogram.  No obvious hernia at the right groin region    Indications: Willy Cordero is an 78 y.o. male who is having surgery for Calculus of gallbladder with acute cholecystitis without obstruction [K80.00]  Right inguinal hernia [K40.90].     The patient was seen in the preoperative area. The risks, benefits, complications, treatment options, non-operative alternatives, expected recovery and outcomes were discussed with the patient. The possibilities of reaction to medication, pulmonary aspiration, injury to surrounding structures, bleeding, recurrent infection, the need for additional procedures, failure to diagnose a condition, and creating a complication requiring transfusion or operation were discussed with the patient. The patient concurred with the proposed plan, giving informed consent.  The site of surgery was properly noted/marked if necessary per policy. The patient has been actively warmed in preoperative area. Preoperative antibiotics have been ordered and given within 1 hours of incision. Venous thrombosis prophylaxis have been ordered including bilateral sequential compression devices    Procedure Details:    Description:  Patient was brought to the operating room placed supine on the table.  Timeout was performed which confirmed patient and procedure.  Perioperative antibiotics were administered.  Gen. anesthesia was administered through an endotracheal tube.  The abdomen was prepped and draped sterilely.    I injected half percent Marcaine and then made a sharp supraumbilical incision with the knife.  Sharp incision was made in the fascia.  The peritoneal cavity was entered under  direct visualization and a Seth port was placed.  The abdomen was insufflated and the patient was placed in steep reverse Trendelenburg.  A 5 mm 30° camera was introduced.  I placed a right upper quadrant 5 mm port and another 5 mL port in the midline subxiphoid area.  The gallbladder was visualized.  It was grasped and retracted superiorly into the right.  Meticulous dissection was then performed in the area of Calot triangle.  Critical view was achieved.  Posterior cystic plate visualized.  The cystic artery and cystic duct were skeletonized.  The artery was divided between hemoclips.  Endo Clip placed on the gallbladder side and then made a ductotomy with EndoShears.  Ranfac cholangiocatheter was inserted through a separate angiocatheter sheath.  Cholangiogram was obtained using C-arm fluoroscopy.  The anatomy was noted to be normal.  No obvious filling defects seen.  Contrast went into the duodenum.  Ranfac catheter removed.  I placed 3 clips on the distal cystic duct and one toward the gallbladder and divided with EndoShears.  The gallbladder was then dissected atraumatically out of the liver bed with hook cautery.  Once it was liberated it was placed in the Endo Catch bag and brought out through the umbilicus.  Liver bed was inspected and noted to be hemostatic.  Clips were noted to be in good position.  Gentle irrigation was performed.  The effluent was noted to be clear.  We took a look down at the right groin and I was not able to visualize any clear evidence of an inguinal hernia.  The ports were removed under direct visualization.  The abdomen was desufflated.  The fascia at the umbilicus was closed with 0 Vicryl.  Wounds were irrigated.  Skin incisions were closed with 4-0 Biosyn and Dermabond.  Patient was ultimately extubated and transferred to the recovery room in satisfactory condition.    Complications:  None; patient tolerated the procedure well.    Disposition: PACU - hemodynamically  stable.  Condition: stable         Task Performed by RNFA or Surgical Assistant:  None          Additional Details:     Attending Attestation: I was present for the entire procedure.    Jamil Colmenares  Phone Number: [phone]

## 2024-11-07 ASSESSMENT — PAIN SCALES - GENERAL: PAINLEVEL_OUTOF10: 2

## 2024-11-08 ENCOUNTER — APPOINTMENT (OUTPATIENT)
Dept: GASTROENTEROLOGY | Facility: EXTERNAL LOCATION | Age: 79
End: 2024-11-08
Payer: COMMERCIAL

## 2024-11-11 LAB
LABORATORY COMMENT REPORT: NORMAL
PATH REPORT.FINAL DX SPEC: NORMAL
PATH REPORT.GROSS SPEC: NORMAL
PATH REPORT.RELEVANT HX SPEC: NORMAL
PATH REPORT.TOTAL CANCER: NORMAL

## 2024-11-12 PROBLEM — I34.0 MITRAL VALVE INSUFFICIENCY: Status: ACTIVE | Noted: 2024-11-05

## 2024-11-18 ENCOUNTER — OFFICE VISIT (OUTPATIENT)
Dept: SURGERY | Facility: CLINIC | Age: 79
End: 2024-11-18
Payer: COMMERCIAL

## 2024-11-18 VITALS
TEMPERATURE: 97.3 F | HEIGHT: 70 IN | BODY MASS INDEX: 23.48 KG/M2 | SYSTOLIC BLOOD PRESSURE: 146 MMHG | WEIGHT: 164 LBS | HEART RATE: 63 BPM | DIASTOLIC BLOOD PRESSURE: 78 MMHG

## 2024-11-18 DIAGNOSIS — Z09 SURGERY FOLLOW-UP: Primary | ICD-10-CM

## 2024-11-18 DIAGNOSIS — I34.1 MITRAL VALVE PROLAPSE: ICD-10-CM

## 2024-11-18 PROCEDURE — 99211 OFF/OP EST MAY X REQ PHY/QHP: CPT | Performed by: SURGERY

## 2024-11-18 PROCEDURE — 1126F AMNT PAIN NOTED NONE PRSNT: CPT | Performed by: SURGERY

## 2024-11-18 PROCEDURE — 1159F MED LIST DOCD IN RCRD: CPT | Performed by: SURGERY

## 2024-11-18 PROCEDURE — 1123F ACP DISCUSS/DSCN MKR DOCD: CPT | Performed by: SURGERY

## 2024-11-18 ASSESSMENT — ENCOUNTER SYMPTOMS: DEPRESSION: 0

## 2024-11-18 ASSESSMENT — PAIN SCALES - GENERAL: PAINLEVEL_OUTOF10: 0-NO PAIN

## 2024-11-18 NOTE — LETTER
November 18, 2024     Tyson Lr MD  [address] [facility] [address]    Patient: Willy Cordero   YOB: 1945   Date of Visit: 11/18/2024       Dear Dr. Tyson Lr MD:    Thank you for referring Willy Cordero to me for evaluation. Below are my notes for this consultation.  If you have questions, please do not hesitate to call me. I look forward to following your patient along with you.       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  Not having much pain.  He is already back to his regular routine and [facility].  Plans are being made for intervention on his mitral valve regurgitation.       Objective    Physical Exam  NAD  A&Ox3  Non icteric  CTA  RR  Abdomen soft min tender. Wounds clean, intact  Extremities warm, well perfused         Relevant Results      No results found for this or any previous visit (from the past 24 hours).    Collected 11/6/2024 11:42       Status: Final result       Visible to patient: Yes (seen)       Dx: Right inguinal hernia; Calculus of ga...    0 Result Notes       Component  Resulting Agency   FINAL DIAGNOSIS   A. Gallbladder, Cholecystectomy:   -- Gallbladder with mild chronic inflammation.      Electronically signed by Monroe Lott MD PhD on 11/11/2024 at 1433      [facility]   By the signature on this report, the individual or group listed as making the Final Interpretation/Diagnosis certifies that they have reviewed this case.            I spent 25 minutes in the professional and overall care of this patient.      Jamil Colmenares MD

## 2024-11-18 NOTE — PROGRESS NOTES
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  Not having much pain.  He is already back to his regular routine and [facility].  Plans are being made for intervention on his mitral valve regurgitation.       Objective     Physical Exam  NAD  A&Ox3  Non icteric  CTA  RR  Abdomen soft min tender. Wounds clean, intact  Extremities warm, well perfused         Relevant Results      No results found for this or any previous visit (from the past 24 hours).    Collected 11/6/2024 11:42       Status: Final result       Visible to patient: Yes (seen)       Dx: Right inguinal hernia; Calculus of ga...    0 Result Notes       Component  Resulting Agency   FINAL DIAGNOSIS   A. Gallbladder, Cholecystectomy:   -- Gallbladder with mild chronic inflammation.      Electronically signed by Monroe Lott MD PhD on 11/11/2024 at 1433      [facility]   By the signature on this report, the individual or group listed as making the Final Interpretation/Diagnosis certifies that they have reviewed this case.           I spent 25 minutes in the professional and overall care of this patient.      Jamil Colmenares MD

## 2024-11-19 ENCOUNTER — APPOINTMENT (OUTPATIENT)
Dept: PRIMARY CARE | Facility: CLINIC | Age: 79
End: 2024-11-19
Payer: COMMERCIAL

## 2024-11-21 ENCOUNTER — TELEPHONE (OUTPATIENT)
Dept: CARDIOLOGY | Facility: HOSPITAL | Age: 79
End: 2024-11-21
Payer: COMMERCIAL

## 2024-11-26 ENCOUNTER — HOSPITAL ENCOUNTER (OUTPATIENT)
Dept: CARDIOLOGY | Facility: HOSPITAL | Age: 79
Discharge: HOME | End: 2024-11-26
Payer: COMMERCIAL

## 2024-11-26 VITALS
OXYGEN SATURATION: 96 % | DIASTOLIC BLOOD PRESSURE: 69 MMHG | RESPIRATION RATE: 20 BRPM | SYSTOLIC BLOOD PRESSURE: 142 MMHG | HEART RATE: 52 BPM

## 2024-11-26 DIAGNOSIS — I34.0 MITRAL VALVE INSUFFICIENCY, UNSPECIFIED ETIOLOGY: ICD-10-CM

## 2024-11-26 LAB
EJECTION FRACTION: 63 %
MITRAL VALVE E/A RATIO: 1.92
RIGHT VENTRICLE PEAK SYSTOLIC PRESSURE: 38.8 MMHG

## 2024-11-26 PROCEDURE — 93320 DOPPLER ECHO COMPLETE: CPT | Performed by: STUDENT IN AN ORGANIZED HEALTH CARE EDUCATION/TRAINING PROGRAM

## 2024-11-26 PROCEDURE — 93320 DOPPLER ECHO COMPLETE: CPT

## 2024-11-26 PROCEDURE — 2500000005 HC RX 250 GENERAL PHARMACY W/O HCPCS: Performed by: STUDENT IN AN ORGANIZED HEALTH CARE EDUCATION/TRAINING PROGRAM

## 2024-11-26 PROCEDURE — 2500000004 HC RX 250 GENERAL PHARMACY W/ HCPCS (ALT 636 FOR OP/ED): Performed by: STUDENT IN AN ORGANIZED HEALTH CARE EDUCATION/TRAINING PROGRAM

## 2024-11-26 PROCEDURE — 99152 MOD SED SAME PHYS/QHP 5/>YRS: CPT | Performed by: STUDENT IN AN ORGANIZED HEALTH CARE EDUCATION/TRAINING PROGRAM

## 2024-11-26 PROCEDURE — 93312 ECHO TRANSESOPHAGEAL: CPT | Performed by: STUDENT IN AN ORGANIZED HEALTH CARE EDUCATION/TRAINING PROGRAM

## 2024-11-26 PROCEDURE — 93325 DOPPLER ECHO COLOR FLOW MAPG: CPT | Performed by: STUDENT IN AN ORGANIZED HEALTH CARE EDUCATION/TRAINING PROGRAM

## 2024-11-26 PROCEDURE — 99152 MOD SED SAME PHYS/QHP 5/>YRS: CPT

## 2024-11-26 RX ORDER — MIDAZOLAM HYDROCHLORIDE 1 MG/ML
INJECTION INTRAMUSCULAR; INTRAVENOUS
Status: DISPENSED
Start: 2024-11-26 | End: 2024-11-26

## 2024-11-26 RX ORDER — LIDOCAINE HYDROCHLORIDE 20 MG/ML
SOLUTION OROPHARYNGEAL AS NEEDED
Status: DISCONTINUED | OUTPATIENT
Start: 2024-11-26 | End: 2024-11-27 | Stop reason: HOSPADM

## 2024-11-26 RX ORDER — FENTANYL CITRATE 50 UG/ML
INJECTION, SOLUTION INTRAMUSCULAR; INTRAVENOUS AS NEEDED
Status: DISCONTINUED | OUTPATIENT
Start: 2024-11-26 | End: 2024-11-27 | Stop reason: HOSPADM

## 2024-11-26 RX ORDER — LIDOCAINE HYDROCHLORIDE 20 MG/ML
SOLUTION OROPHARYNGEAL
Status: DISPENSED
Start: 2024-11-26 | End: 2024-11-26

## 2024-11-26 RX ORDER — FENTANYL CITRATE 50 UG/ML
INJECTION, SOLUTION INTRAMUSCULAR; INTRAVENOUS
Status: DISPENSED
Start: 2024-11-26 | End: 2024-11-26

## 2024-11-26 RX ORDER — MIDAZOLAM HYDROCHLORIDE 1 MG/ML
INJECTION INTRAMUSCULAR; INTRAVENOUS AS NEEDED
Status: DISCONTINUED | OUTPATIENT
Start: 2024-11-26 | End: 2024-11-27 | Stop reason: HOSPADM

## 2024-11-27 ENCOUNTER — APPOINTMENT (OUTPATIENT)
Dept: SURGERY | Facility: CLINIC | Age: 79
End: 2024-11-27
Payer: COMMERCIAL

## 2024-11-27 ENCOUNTER — LAB (OUTPATIENT)
Dept: LAB | Facility: LAB | Age: 79
End: 2024-11-27
Payer: COMMERCIAL

## 2024-11-27 DIAGNOSIS — I34.0 MITRAL VALVE INSUFFICIENCY, UNSPECIFIED ETIOLOGY: ICD-10-CM

## 2024-11-27 LAB
ALBUMIN SERPL BCP-MCNC: 3.9 G/DL (ref 3.4–5)
ALP SERPL-CCNC: 44 U/L (ref 33–136)
ALT SERPL W P-5'-P-CCNC: 18 U/L (ref 10–52)
ANION GAP SERPL CALC-SCNC: 10 MMOL/L (ref 10–20)
AST SERPL W P-5'-P-CCNC: 21 U/L (ref 9–39)
BILIRUB SERPL-MCNC: 1.2 MG/DL (ref 0–1.2)
BUN SERPL-MCNC: 25 MG/DL (ref 6–23)
CALCIUM SERPL-MCNC: 9.1 MG/DL (ref 8.6–10.6)
CHLORIDE SERPL-SCNC: 103 MMOL/L (ref 98–107)
CO2 SERPL-SCNC: 29 MMOL/L (ref 21–32)
CREAT SERPL-MCNC: 1.09 MG/DL (ref 0.5–1.3)
EGFRCR SERPLBLD CKD-EPI 2021: 69 ML/MIN/1.73M*2
GLUCOSE SERPL-MCNC: 77 MG/DL (ref 74–99)
POTASSIUM SERPL-SCNC: 4.7 MMOL/L (ref 3.5–5.3)
PROT SERPL-MCNC: 6.6 G/DL (ref 6.4–8.2)
SODIUM SERPL-SCNC: 137 MMOL/L (ref 136–145)

## 2024-11-27 PROCEDURE — 36415 COLL VENOUS BLD VENIPUNCTURE: CPT

## 2024-11-27 PROCEDURE — 80053 COMPREHEN METABOLIC PANEL: CPT

## 2024-12-03 ENCOUNTER — HOSPITAL ENCOUNTER (OUTPATIENT)
Dept: RADIOLOGY | Facility: HOSPITAL | Age: 79
Discharge: HOME | End: 2024-12-03
Payer: COMMERCIAL

## 2024-12-03 DIAGNOSIS — I34.0 MITRAL VALVE INSUFFICIENCY, UNSPECIFIED ETIOLOGY: ICD-10-CM

## 2024-12-03 PROCEDURE — 71275 CT ANGIOGRAPHY CHEST: CPT | Performed by: RADIOLOGY

## 2024-12-03 PROCEDURE — 2550000001 HC RX 255 CONTRASTS: Performed by: THORACIC SURGERY (CARDIOTHORACIC VASCULAR SURGERY)

## 2024-12-03 PROCEDURE — 74174 CTA ABD&PLVS W/CONTRAST: CPT | Performed by: RADIOLOGY

## 2024-12-03 PROCEDURE — 74174 CTA ABD&PLVS W/CONTRAST: CPT

## 2024-12-11 DIAGNOSIS — I07.1 SEVERE TRICUSPID REGURGITATION: ICD-10-CM

## 2024-12-11 DIAGNOSIS — I34.0 SEVERE MITRAL REGURGITATION: Primary | ICD-10-CM

## 2024-12-11 DIAGNOSIS — I34.0 SEVERE MITRAL REGURGITATION: ICD-10-CM

## 2024-12-11 DIAGNOSIS — I34.0 NONRHEUMATIC MITRAL VALVE REGURGITATION: Primary | ICD-10-CM

## 2024-12-16 ENCOUNTER — CLINICAL SUPPORT (OUTPATIENT)
Dept: PREADMISSION TESTING | Facility: HOSPITAL | Age: 79
End: 2024-12-16
Payer: COMMERCIAL

## 2024-12-16 NOTE — CPM/PAT H&P
CPM/PAT Evaluation       Name: Willy Cordero (Willy Cordero)  /Age: 1945/79 y.o.     { PAT Visit Type:90334}      Chief Complaint: ***    HPI    Willy Cordero is scheduled for ROBOTIC MIS MV REPAIR +/- CABG on 01/15/24      **Data Input  Past Medical History:   Diagnosis Date    Anemia     24 H/H 12.9/40.0    Benign paroxysmal vertigo, unspecified ear     BPH (benign prostatic hyperplasia)     Calcification of coronary artery 2018    CT cardiac scoring done 12/15/16 with score of 436.      Calculus of gallbladder with acute cholecystitis without obstruction     Plan: Laparoscopic Cholecystectomy; Cholangiogram  Laparoscopic Right Inguinal Hernia Repair; Mesh Placement  24    DDD (degenerative disc disease), lumbar     ED (erectile dysfunction)     GERD (gastroesophageal reflux disease)     diet controlled    Hearing aid worn     bilateral    Hyperlipidemia     Myxomatous mitral valve regurgitation     ECHO 24 Severe mitral valve regurgitation.    Right inguinal hernia     Plan: Laparoscopic Cholecystectomy; Cholangiogram  Laparoscopic Right Inguinal Hernia Repair; Mesh Placement  24    Scoliosis     no brace       Past Surgical History:   Procedure Laterality Date    CATARACT EXTRACTION Bilateral 2019    COLONOSCOPY  2019    LUMBAR FUSION      VASECTOMY         Patient  reports being sexually active and has had partner(s) who are female. He reports using the following method of birth control/protection: Male Sterilization.    Family History   Problem Relation Name Age of Onset    Diabetes Mother Caryl Cordero     Hearing loss Mother Caryl Cordero     Heart disease Father Spenser Cordero     Breast cancer Sister Jane Rubi        Allergies   Allergen Reactions    Sulfa (Sulfonamide Antibiotics) Hives       Prior to Admission medications    Medication Sig Start Date End Date Taking? Authorizing Provider   acetaminophen (Tylenol) 500 mg tablet Take 1 tablet (500 mg) by mouth every 6  hours if needed for mild pain (1 - 3).    Historical Provider, MD   atorvastatin (Lipitor) 80 mg tablet TAKE 1 TABLET BY MOUTH ONCE DAILY. 10/7/24   Tyson Lr MD   chlorhexidine (Peridex) 0.12 % solution 15 ml swish and spit for 30 seconds night prior to surgery and morning of surgery 10/29/24   Gladis De La Cruz PA-C   ibuprofen 600 mg tablet Take 1 tablet (600 mg) by mouth every 6 hours if needed for moderate pain (4 - 6) for up to 20 doses. 24   Jamil Colmenares MD   oxyCODONE (Roxicodone) 5 mg immediate release tablet Take 1 tablet (5 mg) by mouth every 6 hours if needed for severe pain (7 - 10) for up to 12 doses. 24   Jamil Colmenares MD   polyethylene glycol (Glycolax, Miralax) 17 gram packet Take 17 g by mouth once daily as needed (for constipation) for up to 10 doses. 24   Jamil Colmenares MD        [facility] ROS     [facility] Physical Exam     Airway      Testing/Diagnostic:         - EK24  Normal sinus rhythm  Right bundle branch block  Left ventricular hypertrophy with QRS widening  Abnormal ECG      - Echo: 24  CONCLUSIONS:   1. Left ventricular ejection fraction is normal, by visual estimate at 60-65%.   2. There is normal right ventricular global systolic function.   3. No left atrial mass.   4. No left atrial thrombus.   5. The mitral valve is mild to moderately thickened with a myxomatous appearance. While there is bileaflet prolapse, A2-A3 scallops are prolapsing further over posterior leaflet, causing an eccentric, posteriorly directed mitral regurgitation that is at least moderate in severity.   6. Mild tricuspid prolapse with mild TR.   7. Mild pulmonic regurgitation.   8. When comapred to TTE images from 24, TR is mild now. MR is at least moderate.      - CT A/P: 10/17/24  IMPRESSION:     1. No CT evidence of acute intra-abdominal or pelvic pathology.   2. Small fat-containing bilateral inguinal hernias with mild protrusion of small bowel loops into the right  inguinal canal.       - CT Tavr: 12/03/24  IMPRESSION:  1. Scattered mild-to-moderate atherosclerotic changes involving the  thoracoabdominal aorta and its branches. Access vessels are patent.  Iliac arteries are tortuous.  2. Moderate coronary artery calcification. Correlate with coronary  artery disease risk factors.  3. Enlarged prostate gland. Correlate with PSA.  4. Additional findings as described above.        Patient Specialist/PCP:         Cardiology: Khanh Muniz 09/25/24 Follow-up myxomatous mitral valve disease with mitral valve prolapse.       Cardiology- Structural Heart: Devante Steel 11/01/24 Presents with severe primary mitral regurgitation with associated moderate to severe tricuspid regurgitation. He will be worked up with a transesophageal echocardiogram, right heart catheterization and coronary angiography. These will be done after his cholecystectomy scheduled 11/6/2024       PCP: Tyson Lr 07/30/24 presents for Annual Exam.       GenSx: Jamil Colmenares 11/18/24 post op visit following recent laparoscopic cholecystectomy on 11/06/24      Cardiac Sx: Don Sung 11/05/24 presents for assessment for mitral valve intervention with worsening severity of his mitral regurgitation, and elevation in his pulmonary artery pressures.     His transthoracic echocardiogram revealed preserved left ventricular function with severe mitral regurgitation and bileaflet prolapse. He also has moderate severe tricuspid regurgitation.           ________________________________________________________  **Data input only. No medications, history or providers verified           Nevaeh Mccoy LPN  Preadmission Testing              Visit Vitals  Smoking Status Never       DASI Risk Score    No data to display       Caprini DVT Assessment    No data to display       Modified Frailty Index    No data to display       CHADS2 Stroke Risk  Current as of 3 days ago        N/A 3 to 100%: High Risk   2 to < 3%: Medium Risk    0 to < 2%: Low Risk     Last Change: N/A          This score determines the patient's risk of having a stroke if the patient has atrial fibrillation.        This score is not applicable to this patient. Components are not calculated.          Revised Cardiac Risk Index    No data to display       Apfel Simplified Score    No data to display       Risk Analysis Index Results This Encounter    No data found in the last 10 encounters.       Prodigy: High Risk  Total Score: 23              Prodigy Age Score      Prodigy Gender Score     Prodigy Previous Opioid Use Score           ARISCAT Score for Postoperative Pulmonary Complications    No data to display       Dooley Perioperative Risk for Myocardial Infarction or Cardiac Arrest (JOSY)    No data to display         Assessment and Plan:     {[facility] EMBEDDED ASSESSMENT AND PLAN:[id number]}

## 2024-12-19 ENCOUNTER — PRE-ADMISSION TESTING (OUTPATIENT)
Dept: PREADMISSION TESTING | Facility: HOSPITAL | Age: 79
End: 2024-12-19
Payer: COMMERCIAL

## 2024-12-19 VITALS
OXYGEN SATURATION: 98 % | DIASTOLIC BLOOD PRESSURE: 74 MMHG | HEART RATE: 68 BPM | TEMPERATURE: 97.7 F | SYSTOLIC BLOOD PRESSURE: 157 MMHG | BODY MASS INDEX: 23.81 KG/M2 | WEIGHT: 166.3 LBS | HEIGHT: 70 IN

## 2024-12-19 DIAGNOSIS — I34.0 MITRAL VALVE INSUFFICIENCY, UNSPECIFIED ETIOLOGY: ICD-10-CM

## 2024-12-19 DIAGNOSIS — Z41.9 SURGERY, ELECTIVE: Primary | ICD-10-CM

## 2024-12-19 LAB
ABO GROUP (TYPE) IN BLOOD: NORMAL
ALBUMIN SERPL BCP-MCNC: 4.3 G/DL (ref 3.4–5)
ALP SERPL-CCNC: 43 U/L (ref 33–136)
ALT SERPL W P-5'-P-CCNC: 27 U/L (ref 10–52)
ANION GAP SERPL CALC-SCNC: 13 MMOL/L (ref 10–20)
ANTIBODY SCREEN: NORMAL
AST SERPL W P-5'-P-CCNC: 31 U/L (ref 9–39)
BILIRUB SERPL-MCNC: 0.8 MG/DL (ref 0–1.2)
BUN SERPL-MCNC: 25 MG/DL (ref 6–23)
CALCIUM SERPL-MCNC: 9.3 MG/DL (ref 8.6–10.6)
CHLORIDE SERPL-SCNC: 103 MMOL/L (ref 98–107)
CO2 SERPL-SCNC: 27 MMOL/L (ref 21–32)
CREAT SERPL-MCNC: 1.05 MG/DL (ref 0.5–1.3)
EGFRCR SERPLBLD CKD-EPI 2021: 73 ML/MIN/1.73M*2
ERYTHROCYTE [DISTWIDTH] IN BLOOD BY AUTOMATED COUNT: 12.9 % (ref 11.5–14.5)
GLUCOSE SERPL-MCNC: 101 MG/DL (ref 74–99)
HCT VFR BLD AUTO: 40.7 % (ref 41–52)
HGB BLD-MCNC: 13.5 G/DL (ref 13.5–17.5)
MCH RBC QN AUTO: 32.2 PG (ref 26–34)
MCHC RBC AUTO-ENTMCNC: 33.2 G/DL (ref 32–36)
MCV RBC AUTO: 97 FL (ref 80–100)
NRBC BLD-RTO: 0 /100 WBCS (ref 0–0)
PLATELET # BLD AUTO: 238 X10*3/UL (ref 150–450)
POTASSIUM SERPL-SCNC: 5.2 MMOL/L (ref 3.5–5.3)
PROT SERPL-MCNC: 7.1 G/DL (ref 6.4–8.2)
RBC # BLD AUTO: 4.19 X10*6/UL (ref 4.5–5.9)
RH FACTOR (ANTIGEN D): NORMAL
SODIUM SERPL-SCNC: 138 MMOL/L (ref 136–145)
WBC # BLD AUTO: 4.3 X10*3/UL (ref 4.4–11.3)

## 2024-12-19 PROCEDURE — 87081 CULTURE SCREEN ONLY: CPT

## 2024-12-19 PROCEDURE — 84075 ASSAY ALKALINE PHOSPHATASE: CPT

## 2024-12-19 PROCEDURE — 85027 COMPLETE CBC AUTOMATED: CPT

## 2024-12-19 PROCEDURE — 36415 COLL VENOUS BLD VENIPUNCTURE: CPT

## 2024-12-19 PROCEDURE — 86901 BLOOD TYPING SEROLOGIC RH(D): CPT

## 2024-12-19 PROCEDURE — 99204 OFFICE O/P NEW MOD 45 MIN: CPT | Performed by: ANESTHESIOLOGY

## 2024-12-19 RX ORDER — CHLORHEXIDINE GLUCONATE ORAL RINSE 1.2 MG/ML
15 SOLUTION DENTAL DAILY
Qty: 473 ML | Refills: 0 | Status: SHIPPED | OUTPATIENT
Start: 2024-12-19 | End: 2025-01-20

## 2024-12-19 RX ORDER — CHLORHEXIDINE GLUCONATE 40 MG/ML
1 SOLUTION TOPICAL DAILY
Qty: 25 ML | Refills: 0 | Status: SHIPPED | OUTPATIENT
Start: 2024-12-19 | End: 2024-12-24

## 2024-12-19 RX ORDER — NAPROXEN SODIUM 220 MG/1
81 TABLET, FILM COATED ORAL DAILY
OUTPATIENT
Start: 2024-12-19

## 2024-12-19 ASSESSMENT — ENCOUNTER SYMPTOMS
GASTROINTESTINAL NEGATIVE: 1
NECK NEGATIVE: 1
EYES NEGATIVE: 1
RESPIRATORY NEGATIVE: 1
CONSTITUTIONAL NEGATIVE: 1
MUSCULOSKELETAL NEGATIVE: 1
NEUROLOGICAL NEGATIVE: 1
ENDOCRINE NEGATIVE: 1
CARDIOVASCULAR NEGATIVE: 1

## 2024-12-19 ASSESSMENT — DUKE ACTIVITY SCORE INDEX (DASI)
CAN YOU DO YARD WORK LIKE RAKING LEAVES, WEEDING OR PUSHING A MOWER: YES
CAN YOU TAKE CARE OF YOURSELF (EAT, DRESS, BATHE, OR USE TOILET): YES
CAN YOU RUN A SHORT DISTANCE: YES
CAN YOU CLIMB A FLIGHT OF STAIRS OR WALK UP A HILL: YES
CAN YOU DO HEAVY WORK AROUND THE HOUSE LIKE SCRUBBING FLOORS OR LIFTING AND MOVING HEAVY FURNITURE: YES
CAN YOU WALK INDOORS, SUCH AS AROUND YOUR HOUSE: YES
DASI METS SCORE: 9.9
CAN YOU HAVE SEXUAL RELATIONS: YES
CAN YOU PARTICIPATE IN MODERATE RECREATIONAL ACTIVITIES LIKE GOLF, BOWLING, DANCING, DOUBLES TENNIS OR THROWING A BASEBALL OR FOOTBALL: YES
CAN YOU DO LIGHT WORK AROUND THE HOUSE LIKE DUSTING OR WASHING DISHES: YES
CAN YOU DO MODERATE WORK AROUND THE HOUSE LIKE VACUUMING, SWEEPING FLOORS OR CARRYING GROCERIES: YES
TOTAL_SCORE: 58.2
CAN YOU WALK A BLOCK OR TWO ON LEVEL GROUND: YES
CAN YOU PARTICIPATE IN STRENOUS SPORTS LIKE SWIMMING, SINGLES TENNIS, FOOTBALL, BASKETBALL, OR SKIING: YES

## 2024-12-19 ASSESSMENT — PAIN SCALES - GENERAL: PAINLEVEL_OUTOF10: 0 - NO PAIN

## 2024-12-19 ASSESSMENT — PAIN - FUNCTIONAL ASSESSMENT: PAIN_FUNCTIONAL_ASSESSMENT: 0-10

## 2024-12-19 NOTE — CPM/PAT H&P
CPM/PAT Evaluation     Name: Willy Cordero (Willy Cordero)  /Age: 1945/79 y.o.     In-Person       Chief Complaint: Surgery    HPI  78 y/o M scheduled for Mitral valve repair and CABG on 1/15 with Dr. Sung. PMHX includes Mitral valve regurgitation, CAD, HLD, spinal stenosis.  Presents to [facility] today for perioperative risk stratification and optimization.    Past Medical History:   Diagnosis Date    Anemia     24 H/H 12.9/40.0    Benign paroxysmal vertigo, unspecified ear     BPH (benign prostatic hyperplasia)     Calcification of coronary artery 2018    CT cardiac scoring done 12/15/16 with score of 436.      Calculus of gallbladder with acute cholecystitis without obstruction     s/p Cholecystectomy; Cholangiogram, Right Inguinal Hernia Repair; Mesh Placement 24    DDD (degenerative disc disease), lumbar     ED (erectile dysfunction)     GERD (gastroesophageal reflux disease)     diet controlled    Hearing aid worn     bilateral    Heart murmur     Hyperlipidemia     Myxomatous mitral valve regurgitation     ECHO 24 Severe mitral valve regurgitation, bileaflet prolapse    Right inguinal hernia     Scoliosis     no brace    Spinal stenosis     lumbar stenosis       Past Surgical History:   Procedure Laterality Date    CATARACT EXTRACTION Bilateral 2019    CHOLECYSTECTOMY      COLONOSCOPY  2019    LUMBAR FUSION      VASECTOMY         Patient  reports being sexually active and has had partner(s) who are female. He reports using the following method of birth control/protection: Male Sterilization.    Family History   Problem Relation Name Age of Onset    Diabetes Mother Caryl Cordero     Hearing loss Mother Caryl Coredro     Heart disease Father Spenser Cordero     Breast cancer Sister Jane Rubi        Allergies   Allergen Reactions    Sulfa (Sulfonamide Antibiotics) Hives       Prior to Admission medications    Medication Sig Start Date End Date Taking? Authorizing Provider    acetaminophen (Tylenol) 500 mg tablet Take 1 tablet (500 mg) by mouth every 6 hours if needed for mild pain (1 - 3).    Historical Provider, MD   atorvastatin (Lipitor) 80 mg tablet TAKE 1 TABLET BY MOUTH ONCE DAILY. 10/7/24   Tyson Lr MD   chlorhexidine (Peridex) 0.12 % solution 15 ml swish and spit for 30 seconds night prior to surgery and morning of surgery 10/29/24   Gladis De La Cruz PA-C   ibuprofen 600 mg tablet Take 1 tablet (600 mg) by mouth every 6 hours if needed for moderate pain (4 - 6) for up to 20 doses. 11/6/24   Jamil Colmenares MD   oxyCODONE (Roxicodone) 5 mg immediate release tablet Take 1 tablet (5 mg) by mouth every 6 hours if needed for severe pain (7 - 10) for up to 12 doses. 11/6/24   Jamil Colmenares MD   polyethylene glycol (Glycolax, Miralax) 17 gram packet Take 17 g by mouth once daily as needed (for constipation) for up to 10 doses. 11/6/24   Jamil Colmenares MD        PAT ROS:   Constitutional:   neg    Neuro/Psych:   neg    Eyes:   neg    Ears:   neg    Nose:   neg    Mouth:   neg    Throat:   neg    Neck:   neg    Cardio:   neg    Respiratory:   neg    Endocrine:   neg    GI:   neg    :   neg    Musculoskeletal:   neg    Hematologic:   neg    Skin:  neg        PAT Physical Exam   Constitutional:       Appearance: Normal appearance.   HENT:      Head: Normocephalic and atraumatic.      Right Ear: External ear normal.      Left Ear: External ear normal.      Nose: Nose normal.      Mouth/Throat:      Pharynx: Oropharynx is clear.   Eyes:      Conjunctiva/sclera: Conjunctivae normal.   Cardiovascular:      Rate and Rhythm: Normal rate and regular rhythm.      Heart sounds: No murmur (grade 2/6 systolic) heard.  Pulmonary:      Effort: Pulmonary effort is normal.      Breath sounds: Normal breath sounds.   Abdominal:      General: Abdomen is flat.      Palpations: Abdomen is soft.   Musculoskeletal:         General: Normal range of motion.      Cervical back: Normal range of  motion and neck supple.   Skin:     General: Skin is warm and dry.   Neurological:      General: No focal deficit present.      Mental Status: He is alert and oriented to person, place, and time.   Psychiatric:         Mood and Affect: Mood normal.         Behavior: Behavior normal.         Thought Content: Thought content normal.         Judgment: Judgment normal.        PAT AIRWAY:   Airway:     Mallampati::  II    TM distance::  >3 FB    Neck ROM::  Full    Vitals:    12/19/24 0855   BP: 157/74   Pulse: 68   Temp: 36.5 °C (97.7 °F)   SpO2: 98%     Visit Vitals  Smoking Status Never     Past Surgical History:   Procedure Laterality Date    CATARACT EXTRACTION Bilateral 2019    CHOLECYSTECTOMY      COLONOSCOPY  2019    LUMBAR FUSION      VASECTOMY       Past Medical History:   Diagnosis Date    Anemia     7/30/24 H/H 12.9/40.0    Benign paroxysmal vertigo, unspecified ear     BPH (benign prostatic hyperplasia)     Calcification of coronary artery 03/19/2018    CT cardiac scoring done 12/15/16 with score of 436.      Calculus of gallbladder with acute cholecystitis without obstruction     s/p Cholecystectomy; Cholangiogram, Right Inguinal Hernia Repair; Mesh Placement 11/6/24    DDD (degenerative disc disease), lumbar     ED (erectile dysfunction)     GERD (gastroesophageal reflux disease)     diet controlled    Hearing aid worn     bilateral    Heart murmur     Hyperlipidemia     Myxomatous mitral valve regurgitation     ECHO 8/21/24 Severe mitral valve regurgitation, bileaflet prolapse    Right inguinal hernia     Scoliosis     no brace    Spinal stenosis     lumbar stenosis       Current Outpatient Medications:     atorvastatin (Lipitor) 80 mg tablet, TAKE 1 TABLET BY MOUTH ONCE DAILY., Disp: 90 tablet, Rfl: 0    acetaminophen (Tylenol) 500 mg tablet, Take 1 tablet (500 mg) by mouth every 6 hours if needed for mild pain (1 - 3)., Disp: , Rfl:     chlorhexidine (Hibiclens) 4 % external liquid, Apply 1 Application  topically once daily for 5 doses. Use per CPM/PAT provided instructions, Disp: 25 mL, Rfl: 0    chlorhexidine (Peridex) 0.12 % solution, 15 ml swish and spit for 30 seconds night prior to surgery and morning of surgery, Disp: 473 mL, Rfl: 0    chlorhexidine (Peridex) 0.12 % solution, Use 15 mL in the mouth or throat once daily for 32 doses., Disp: 473 mL, Rfl: 0    ibuprofen 600 mg tablet, Take 1 tablet (600 mg) by mouth every 6 hours if needed for moderate pain (4 - 6) for up to 20 doses., Disp: 20 tablet, Rfl: 0    oxyCODONE (Roxicodone) 5 mg immediate release tablet, Take 1 tablet (5 mg) by mouth every 6 hours if needed for severe pain (7 - 10) for up to 12 doses., Disp: 12 tablet, Rfl: 0    polyethylene glycol (Glycolax, Miralax) 17 gram packet, Take 17 g by mouth once daily as needed (for constipation) for up to 10 doses., Disp: 10 packet, Rfl: 0  Prior to Admission medications    Medication Sig Start Date End Date Taking? Authorizing Provider   atorvastatin (Lipitor) 80 mg tablet TAKE 1 TABLET BY MOUTH ONCE DAILY. 10/7/24  Yes Tyson Lr MD   acetaminophen (Tylenol) 500 mg tablet Take 1 tablet (500 mg) by mouth every 6 hours if needed for mild pain (1 - 3).    Historical Provider, MD   chlorhexidine (Hibiclens) 4 % external liquid Apply 1 Application topically once daily for 5 doses. Use per CPM/PAT provided instructions 12/19/24 12/24/24  Panchito Flores DO   chlorhexidine (Peridex) 0.12 % solution 15 ml swish and spit for 30 seconds night prior to surgery and morning of surgery 10/29/24   Gladis De La Cruz PA-C   chlorhexidine (Peridex) 0.12 % solution Use 15 mL in the mouth or throat once daily for 32 doses. 12/19/24 1/20/25  Panchito Flores DO   ibuprofen 600 mg tablet Take 1 tablet (600 mg) by mouth every 6 hours if needed for moderate pain (4 - 6) for up to 20 doses. 11/6/24   Jamil Colmenares MD   oxyCODONE (Roxicodone) 5 mg immediate release tablet Take 1 tablet (5 mg) by mouth every 6 hours  if needed for severe pain (7 - 10) for up to 12 doses. 11/6/24   Jamil Colmenares MD   polyethylene glycol (Glycolax, Miralax) 17 gram packet Take 17 g by mouth once daily as needed (for constipation) for up to 10 doses. 11/6/24   Jamil Colmenares MD     Allergies   Allergen Reactions    Sulfa (Sulfonamide Antibiotics) Hives     Social History     Tobacco Use    Smoking status: Never    Smokeless tobacco: Never   Substance Use Topics    Alcohol use: Yes     Alcohol/week: 14.0 standard drinks of alcohol     Types: 14 Glasses of wine per week     Encounter Date: 09/25/24   ECG 12 lead (Clinic Performed)   Result Value    Ventricular Rate 61    Atrial Rate 61    AR Interval 164    QRS Duration 136    QT Interval 420    QTC Calculation(Bazett) 422    P Axis 87    R Axis 90    T Axis 55    QRS Count 10    Q Onset 217    P Onset 135    P Offset 190    T Offset 427    QTC Fredericia 422    Narrative    Normal sinus rhythm  Right bundle branch block  Left ventricular hypertrophy with QRS widening  Abnormal ECG  When compared with ECG of 10-DEC-2018 08:16,  Right bundle branch block is now Present  Confirmed by Khanh Muniz ([id number]) on 9/30/2024 4:57:05 PM     Assessment and Plan:  78 y/o M scheduled for Mitral valve repair and CABG on 1/15 with Dr. Sung. PMHX includes Mitral valve regurgitation, CAD, HLD, spinal stenosis.  Presents to CPM today for perioperative risk stratification and optimization.    Neuro:  No neurologic diagnosis or significant findings on chart review, clinical presentation and evaluation.  No grossly apparent neurologic perioperative risk.  Patient is at increased risk for perioperative CVA secondary to  cardiac disease, HTN, operative time > 2.5 hours, cardiac surgery    HEENT:  No HEENT diagnosis or significant findings on chart review or clinical presentation and evaluation. No further preoperative testing/intervention indicated at this time.    Cardiovascular:  CAD - Pending CABG and mitral  valve repair 1/15  - Left heart catheterization scheduled 1/03.   HLD - atorvastatin  In 2016, a coronary calcium score was 437.  Echo: Preserved left ventricular function with severe mitral regurgitation and bileaflet prolapse. Moderate severe tricuspid regurgitation. Right ventricular systolic pressure was 45 mmHg.   CT on 12/3/24 Moderate coronary artery calcifications are seen.   METS: >4  RCRI: 1 point, 6.0% risk for postoperative MACE   JOSY: .03% risk for 30 day postoperative MACE  EKG - Normal sinus rhythm  Right bundle branch block  Left ventricular hypertrophy with QRS widening    Pulmonary:  No pulmonary diagnosis or significant findings on chart review or clinical presentation.  No further preoperative testing is indicated at this time.  Stop Bang score is 1 placing patient at low risk for ZAHIDA  ARISCAT: <26 points, 1.6% risk of in-hospital postoperative pulmonary complication  PRODIGY: Low risk for opioid induced respiratory depression    Renal:   No renal diagnosis, however patient is at increase risk for perioperative renal complications secondary toHTN, cardiac surgery  Pt at Moderate risk for perioperative SOCORRO based on Dynamic Predictive Scoring Tool for Perioperative SOCORRO     Endocrine:  No endocrine diagnosis or significant findings on chart review or clinical presentation and evaluation. No further testing or intervention is indicated at this time.    Hematologic:  No hematologic diagnosis or significant findings on chart review or clinical presentation and evaluation. No further testing or intervention is indicated at this time.   Caprini Score 10, patient at Moderate risk for perioperative DVT.  Patient provided with VTE education/handout.    Gastrointestinal:   Recent cholecystectomy in November.   Eat-10 score 1  Apfel 0    Infectious disease:   No infectious diagnosis or significant findings on chart review or clinical presentation and evaluation.   Prescription provided for CHG body wash and  dental rinse. CHG use instructions reviewed and provided to patient.  Staph screen collected    Musculoskeletal:   No diagnosis or significant findings on chart review or clinical presentation and evaluation.     Anesthesia/Airway:  No anesthesia complications    Medication instructions and NPO guidelines reviewed with the patient. All questions or concerns discussed and addressed.      Patient seen and staffed with Dr. Naqvi.    Panchito Flores DO  Anesthesiology PGY-3

## 2024-12-19 NOTE — H&P (VIEW-ONLY)
[facility]/PAT Evaluation     Name: Willy Cordero (Willy Cordero)  /Age: 1945/79 y.o.     In-Person       Chief Complaint: Surgery    HPI  78 y/o M scheduled for Mitral valve repair and CABG on 1/15 with Dr. Sung. PMHX includes Mitral valve regurgitation, CAD, HLD, spinal stenosis.  Presents to [facility] today for perioperative risk stratification and optimization.    Past Medical History:   Diagnosis Date    Anemia     24 H/H 12.9/40.0    Benign paroxysmal vertigo, unspecified ear     BPH (benign prostatic hyperplasia)     Calcification of coronary artery 2018    CT cardiac scoring done 12/15/16 with score of 436.      Calculus of gallbladder with acute cholecystitis without obstruction     s/p Cholecystectomy; Cholangiogram, Right Inguinal Hernia Repair; Mesh Placement 24    DDD (degenerative disc disease), lumbar     ED (erectile dysfunction)     GERD (gastroesophageal reflux disease)     diet controlled    Hearing aid worn     bilateral    Heart murmur     Hyperlipidemia     Myxomatous mitral valve regurgitation     ECHO 24 Severe mitral valve regurgitation, bileaflet prolapse    Right inguinal hernia     Scoliosis     no brace    Spinal stenosis     lumbar stenosis       Past Surgical History:   Procedure Laterality Date    CATARACT EXTRACTION Bilateral 2019    CHOLECYSTECTOMY      COLONOSCOPY  2019    LUMBAR FUSION      VASECTOMY         Patient  reports being sexually active and has had partner(s) who are female. He reports using the following method of birth control/protection: Male Sterilization.    Family History   Problem Relation Name Age of Onset    Diabetes Mother Caryl Cordero     Hearing loss Mother Caryl Cordero     Heart disease Father Spenser Codrero     Breast cancer Sister Jane Rubi        Allergies   Allergen Reactions    Sulfa (Sulfonamide Antibiotics) Hives       Prior to Admission medications    Medication Sig Start Date End Date Taking? Authorizing Provider    acetaminophen (Tylenol) 500 mg tablet Take 1 tablet (500 mg) by mouth every 6 hours if needed for mild pain (1 - 3).    Historical Provider, MD   atorvastatin (Lipitor) 80 mg tablet TAKE 1 TABLET BY MOUTH ONCE DAILY. 10/7/24   Tyson Lr MD   chlorhexidine (Peridex) 0.12 % solution 15 ml swish and spit for 30 seconds night prior to surgery and morning of surgery 10/29/24   Gladis De La Cruz PA-C   ibuprofen 600 mg tablet Take 1 tablet (600 mg) by mouth every 6 hours if needed for moderate pain (4 - 6) for up to 20 doses. 11/6/24   Jamil Colmenares MD   oxyCODONE (Roxicodone) 5 mg immediate release tablet Take 1 tablet (5 mg) by mouth every 6 hours if needed for severe pain (7 - 10) for up to 12 doses. 11/6/24   Jamil Colmenares MD   polyethylene glycol (Glycolax, Miralax) 17 gram packet Take 17 g by mouth once daily as needed (for constipation) for up to 10 doses. 11/6/24   Jamil Colmenares MD        PAT ROS:   Constitutional:   neg    Neuro/Psych:   neg    Eyes:   neg    Ears:   neg    Nose:   neg    Mouth:   neg    Throat:   neg    Neck:   neg    Cardio:   neg    Respiratory:   neg    Endocrine:   neg    GI:   neg    :   neg    Musculoskeletal:   neg    Hematologic:   neg    Skin:  neg        PAT Physical Exam   Constitutional:       Appearance: Normal appearance.   HENT:      Head: Normocephalic and atraumatic.      Right Ear: External ear normal.      Left Ear: External ear normal.      Nose: Nose normal.      Mouth/Throat:      Pharynx: Oropharynx is clear.   Eyes:      Conjunctiva/sclera: Conjunctivae normal.   Cardiovascular:      Rate and Rhythm: Normal rate and regular rhythm.      Heart sounds: No murmur (grade 2/6 systolic) heard.  Pulmonary:      Effort: Pulmonary effort is normal.      Breath sounds: Normal breath sounds.   Abdominal:      General: Abdomen is flat.      Palpations: Abdomen is soft.   Musculoskeletal:         General: Normal range of motion.      Cervical back: Normal range of  motion and neck supple.   Skin:     General: Skin is warm and dry.   Neurological:      General: No focal deficit present.      Mental Status: He is alert and oriented to person, place, and time.   Psychiatric:         Mood and Affect: Mood normal.         Behavior: Behavior normal.         Thought Content: Thought content normal.         Judgment: Judgment normal.        PAT AIRWAY:   Airway:     Mallampati::  II    TM distance::  >3 FB    Neck ROM::  Full    Vitals:    12/19/24 0855   BP: 157/74   Pulse: 68   Temp: 36.5 °C (97.7 °F)   SpO2: 98%     Visit Vitals  Smoking Status Never     Past Surgical History:   Procedure Laterality Date    CATARACT EXTRACTION Bilateral 2019    CHOLECYSTECTOMY      COLONOSCOPY  2019    LUMBAR FUSION      VASECTOMY       Past Medical History:   Diagnosis Date    Anemia     7/30/24 H/H 12.9/40.0    Benign paroxysmal vertigo, unspecified ear     BPH (benign prostatic hyperplasia)     Calcification of coronary artery 03/19/2018    CT cardiac scoring done 12/15/16 with score of 436.      Calculus of gallbladder with acute cholecystitis without obstruction     s/p Cholecystectomy; Cholangiogram, Right Inguinal Hernia Repair; Mesh Placement 11/6/24    DDD (degenerative disc disease), lumbar     ED (erectile dysfunction)     GERD (gastroesophageal reflux disease)     diet controlled    Hearing aid worn     bilateral    Heart murmur     Hyperlipidemia     Myxomatous mitral valve regurgitation     ECHO 8/21/24 Severe mitral valve regurgitation, bileaflet prolapse    Right inguinal hernia     Scoliosis     no brace    Spinal stenosis     lumbar stenosis       Current Outpatient Medications:     atorvastatin (Lipitor) 80 mg tablet, TAKE 1 TABLET BY MOUTH ONCE DAILY., Disp: 90 tablet, Rfl: 0    acetaminophen (Tylenol) 500 mg tablet, Take 1 tablet (500 mg) by mouth every 6 hours if needed for mild pain (1 - 3)., Disp: , Rfl:     chlorhexidine (Hibiclens) 4 % external liquid, Apply 1 Application  topically once daily for 5 doses. Use per CPM/PAT provided instructions, Disp: 25 mL, Rfl: 0    chlorhexidine (Peridex) 0.12 % solution, 15 ml swish and spit for 30 seconds night prior to surgery and morning of surgery, Disp: 473 mL, Rfl: 0    chlorhexidine (Peridex) 0.12 % solution, Use 15 mL in the mouth or throat once daily for 32 doses., Disp: 473 mL, Rfl: 0    ibuprofen 600 mg tablet, Take 1 tablet (600 mg) by mouth every 6 hours if needed for moderate pain (4 - 6) for up to 20 doses., Disp: 20 tablet, Rfl: 0    oxyCODONE (Roxicodone) 5 mg immediate release tablet, Take 1 tablet (5 mg) by mouth every 6 hours if needed for severe pain (7 - 10) for up to 12 doses., Disp: 12 tablet, Rfl: 0    polyethylene glycol (Glycolax, Miralax) 17 gram packet, Take 17 g by mouth once daily as needed (for constipation) for up to 10 doses., Disp: 10 packet, Rfl: 0  Prior to Admission medications    Medication Sig Start Date End Date Taking? Authorizing Provider   atorvastatin (Lipitor) 80 mg tablet TAKE 1 TABLET BY MOUTH ONCE DAILY. 10/7/24  Yes Tyson Lr MD   acetaminophen (Tylenol) 500 mg tablet Take 1 tablet (500 mg) by mouth every 6 hours if needed for mild pain (1 - 3).    Historical Provider, MD   chlorhexidine (Hibiclens) 4 % external liquid Apply 1 Application topically once daily for 5 doses. Use per CPM/PAT provided instructions 12/19/24 12/24/24  Panchito Flores DO   chlorhexidine (Peridex) 0.12 % solution 15 ml swish and spit for 30 seconds night prior to surgery and morning of surgery 10/29/24   Gladis De La Cruz PA-C   chlorhexidine (Peridex) 0.12 % solution Use 15 mL in the mouth or throat once daily for 32 doses. 12/19/24 1/20/25  Panchito Flores DO   ibuprofen 600 mg tablet Take 1 tablet (600 mg) by mouth every 6 hours if needed for moderate pain (4 - 6) for up to 20 doses. 11/6/24   Jamil Colmenares MD   oxyCODONE (Roxicodone) 5 mg immediate release tablet Take 1 tablet (5 mg) by mouth every 6 hours  if needed for severe pain (7 - 10) for up to 12 doses. 11/6/24   Jamil Colmenares MD   polyethylene glycol (Glycolax, Miralax) 17 gram packet Take 17 g by mouth once daily as needed (for constipation) for up to 10 doses. 11/6/24   Jamil Colmenares MD     Allergies   Allergen Reactions    Sulfa (Sulfonamide Antibiotics) Hives     Social History     Tobacco Use    Smoking status: Never    Smokeless tobacco: Never   Substance Use Topics    Alcohol use: Yes     Alcohol/week: 14.0 standard drinks of alcohol     Types: 14 Glasses of wine per week     Encounter Date: 09/25/24   ECG 12 lead (Clinic Performed)   Result Value    Ventricular Rate 61    Atrial Rate 61    FL Interval 164    QRS Duration 136    QT Interval 420    QTC Calculation(Bazett) 422    P Axis 87    R Axis 90    T Axis 55    QRS Count 10    Q Onset 217    P Onset 135    P Offset 190    T Offset 427    QTC Fredericia 422    Narrative    Normal sinus rhythm  Right bundle branch block  Left ventricular hypertrophy with QRS widening  Abnormal ECG  When compared with ECG of 10-DEC-2018 08:16,  Right bundle branch block is now Present  Confirmed by Khanh Muniz ([id number]) on 9/30/2024 4:57:05 PM     Assessment and Plan:  80 y/o M scheduled for Mitral valve repair and CABG on 1/15 with Dr. Sung. PMHX includes Mitral valve regurgitation, CAD, HLD, spinal stenosis.  Presents to [facility] today for perioperative risk stratification and optimization.    Neuro:  No neurologic diagnosis or significant findings on chart review, clinical presentation and evaluation.  No grossly apparent neurologic perioperative risk.  Patient is at increased risk for perioperative CVA secondary to  cardiac disease, HTN, operative time > 2.5 hours, cardiac surgery    HEENT:  No HEENT diagnosis or significant findings on chart review or clinical presentation and evaluation. No further preoperative testing/intervention indicated at this time.    Cardiovascular:  CAD - Pending CABG and mitral  valve repair 1/15  - Left heart catheterization scheduled 1/03.   HLD - atorvastatin  In 2016, a coronary calcium score was 437.  Echo: Preserved left ventricular function with severe mitral regurgitation and bileaflet prolapse. Moderate severe tricuspid regurgitation. Right ventricular systolic pressure was 45 mmHg.   CT on 12/3/24 Moderate coronary artery calcifications are seen.   METS: >4  RCRI: 1 point, 6.0% risk for postoperative MACE   JOSY: .03% risk for 30 day postoperative MACE  EKG - Normal sinus rhythm  Right bundle branch block  Left ventricular hypertrophy with QRS widening    Pulmonary:  No pulmonary diagnosis or significant findings on chart review or clinical presentation.  No further preoperative testing is indicated at this time.  Stop Bang score is 1 placing patient at low risk for ZAHIDA  ARISCAT: <26 points, 1.6% risk of in-hospital postoperative pulmonary complication  PRODIGY: Low risk for opioid induced respiratory depression    Renal:   No renal diagnosis, however patient is at increase risk for perioperative renal complications secondary toHTN, cardiac surgery  Pt at Moderate risk for perioperative SOCORRO based on Dynamic Predictive Scoring Tool for Perioperative SOCORRO     Endocrine:  No endocrine diagnosis or significant findings on chart review or clinical presentation and evaluation. No further testing or intervention is indicated at this time.    Hematologic:  No hematologic diagnosis or significant findings on chart review or clinical presentation and evaluation. No further testing or intervention is indicated at this time.   Caprini Score 10, patient at Moderate risk for perioperative DVT.  Patient provided with VTE education/handout.    Gastrointestinal:   Recent cholecystectomy in November.   Eat-10 score 1  Apfel 0    Infectious disease:   No infectious diagnosis or significant findings on chart review or clinical presentation and evaluation.   Prescription provided for CHG body wash and  dental rinse. CHG use instructions reviewed and provided to patient.  Staph screen collected    Musculoskeletal:   No diagnosis or significant findings on chart review or clinical presentation and evaluation.     Anesthesia/Airway:  No anesthesia complications    Medication instructions and NPO guidelines reviewed with the patient. All questions or concerns discussed and addressed.      Patient seen and staffed with Dr. Naqvi.    Panchito Flores DO  Anesthesiology PGY-3

## 2024-12-19 NOTE — PREPROCEDURE INSTRUCTIONS
Thank you for visiting [facility] today for your pre-procedure evaluation, you were seen by Dr. Naqvi, [phone]    This summary includes instructions and information to aid you during your perioperative period.  Please read carefully. If you have any questions about your visit today, please call the number listed above.  If you become ill or have any changes to your health before your surgery, please contact your primary care provider and alert your surgeon.    Preparing for your Surgery       Exercises  Preoperative Deep Breathing Exercises  Why it is important to do deep breathing exercises before my surgery?  Deep breathing exercises strengthen your breathing muscles.  This helps you to recover after your surgery and decreases the chance of breathing complications.  How are the deep breathing exercises done?  Sit straight with your back supported.  Breathe in deeply and slowly through your nose. Your lower rib cage should expand and your abdomen may move forward.  Hold that breath for 3 to 5 seconds.  Breathe out through pursed lips, slowly and completely.  Rest and repeat 10 times every hour while awake.  Rest longer if you become dizzy or lightheaded.    Preoperative Brain Exercises    What are brain exercises?  A brain exercise is any activity that engages your thinking (cognitive) skills.    What types of activities are considered brain exercises?  Jigsaw puzzles, crossword puzzles, word jumble, memory games, word search, and many more.  Many can be found free online or on your phone via a mobile fernanda.    Why should I do brain exercises before my surgery?  More recent research has shown brain exercise before surgery can lower the risk of postoperative delirium (confusion) which can be especially important for older adults.  Patients who did brain exercises for 5 to 10 hours the days before surgery, cut their risk of postoperative delirium in half up to 1 week after  surgery.    Sit-to-Stand Exercise    What is the sit-to-stand exercise?  The sit-to-stand exercise strengthens the muscles of your lower body and muscles in the center of your body (core muscles for stability) helping to maintain and improve your strength and mobility.  How do I do the sit-to-stand exercise?  The goal is to do this exercise without using your arms or hands.  If this is too difficult, use your arms and hands or a chair with armrests to help slowly push yourself to the standing position and lower yourself back to the sitting position. As the movement becomes easier use your arms and hands less.    Steps to the sit-to-stand exercise  Sit up tall in a sturdy chair, knees bent, feet flat on the floor shoulder-width apart.  Shift your hips/pelvis forward in the chair to correctly position yourself for the next movement.  Lean forward at your hips.  Stand up straight putting equal weight on both feet.  Check to be sure you are properly aligned with the chair, in a slow controlled movement sit back down.  Repeat this exercise 10-15 times.  If needed you can do it fewer times until your strength improves.  Rest for 1 minute.  Do another 10-15 sit-to-stand exercises.  Try to do this in the morning and evening.        Instructions    Preoperative Fasting Guidelines    Why must I stop eating and drinking near surgery time?  With sedation, food or liquid in your stomach can enter your lungs causing serious complications  Food can increase nausea and vomiting  When do I need to stop eating and drinking before my surgery?      Do not eat any food after midnight the night before your surgery/procedure. You may have up to 13.5 ounces of clear liquid until TWO hours before your instructed arrival time to the hospital.  This includes water, black tea/coffee, (no milk or cream) apple juice, and electrolyte drinks (Gatorade). You may chew gum until TWO hours before your surgery/procedure            Simple things you can  do to help prevent blood clots     Blood clots are blockages that can form in the body's veins. When a blood clot forms in your deep veins, it may be called a deep vein thrombosis, or DVT for short. Blood clots can happen in any part of the body where blood flows, but they are most common in the arms and legs. If a piece of a blood clot breaks free and travels to the lungs, it is called a pulmonary embolus (PE). A PE can be a very serious problem.         Being in the hospital or having surgery can raise your chances of getting a blood clot because you may not be well enough to move around as much as you normally do.         Ways you can help prevent blood clots in the hospital       Wearing SCDs  SCDs stands for Sequential Compression Devices.   SCDs are special sleeves that wrap around your legs. They attach to a pump that fills them with air to gently squeeze your legs every few minutes.  This helps return the blood in your legs to your heart.   SCDs should only be taken off when walking or bathing. SCDs may not be comfortable, but they can help save your life.              Pump SCD leg sleeves  Wearing compression stockings - if your doctor orders them. These special snug-fitting stockings gently squeeze your legs to help blood flow.       Walking. Walking helps move the blood in your legs.   If your doctor says it is ok, try walking the halls at least   5 times a day. Ask us to help you get up, so you don't fall.      Taking any blood-thinning medicines your doctor orders.              Ways you can help prevent blood clots at home         Wearing compression stockings - if your doctor orders them.   Walking - to help move the blood in your legs.    Taking any blood-thinning medicines your doctor orders.      Signs of a blood clot or PE    Tell your doctor or nurse right away if you have any of the problems listed below.         If you are at home, seek medical care right away. Call 911 for chest pain or  problems breathing.       Signs of a blood clot (DVT) - such as pain, swelling, redness, or warmth in your arm or legs.  Signs of a pulmonary embolism (PE) - such as chest pain or feeling short of breath      Tobacco and Alcohol;  Do not drink alcohol or smoke within 24 hours of surgery.  It is best to quit smoking for as long as possible before any surgery or procedure.    The Week before Surgery        Seven days before Surgery  Check your [facility] medication instructions  Do the exercises provided to you by [facility]   Arrange for a responsible, adult licensed  to take you home after surgery and stay with you for 24 hours.  You will not be permitted to drive yourself home if you have received any anesthetic/sedation  Six days before surgery  Check your [facility] medication instructions  Do the exercises provided to you by [facility]   Start using Chlorhexidene (CHG) body wash if prescribed  Five days before surgery  Check your [facility] medication instructions  Do the exercises provided to you by [facility]   Continue to use CHG body wash if prescribed  Three days before surgery  Check your [facility] medication instructions  Do the exercises provided to you by [facility]   Continue to use CHG body wash if prescribed  Two days before surgery  Check your [facility] medication instructions  Do the exercises provided to you by [facility]   Continue to use CHG body wash if prescribed    The Day before Surgery       Check your [facility] medication and all other [facility] instructions including when to stop eating and drinking  You will be called with your arrival time for surgery in the late afternoon.  If you do not receive a call please reach out to your surgeon's office.  Do not smoke or drink 24 hours before surgery  Prepare items to bring with you to the hospital  Shower with your chlorhexidine wash if prescribed  Brush your teeth and use your chlorhexidine dental rinse if prescribed    The Day of Surgery       Check your [facility] medication instructions  Ensure you follow the  instructions for when to stop eating and drinking  Shower, if prescribed use CHG.  Do not apply any lotions, creams, moisturizers, perfume or deodorant  Brush your teeth and use your CHG dental rinse if prescribed  Wear loose comfortable clothing  Avoid make-up  Remove  jewelry and piercings, consider professional piercing removal with a plastic spacer if needed  Bring photo ID and Insurance card  Bring an accurate medication list that includes medication dose, frequency and allergies  Bring a copy of your advanced directives (will, health care power of )  Bring any devices and controllers as well as medical devices you have been provided with for surgery (CPAP, slings, braces, etc.)  Dentures, eyeglasses, and contacts will be removed before surgery, please bring cases for contacts or glasses     Is This A New Presentation, Or A Follow-Up?: Skin Lesion What Type Of Note Output Would You Prefer (Optional)?: Standard Output How Severe Is Your Skin Lesion?: mild Has Your Skin Lesion Been Treated?: not been treated

## 2024-12-20 LAB — STAPHYLOCOCCUS SPEC CULT: NORMAL

## 2025-01-03 ENCOUNTER — HOSPITAL ENCOUNTER (OUTPATIENT)
Facility: HOSPITAL | Age: 80
Setting detail: OUTPATIENT SURGERY
Discharge: HOME | End: 2025-01-03
Attending: INTERNAL MEDICINE | Admitting: INTERNAL MEDICINE
Payer: COMMERCIAL

## 2025-01-03 ENCOUNTER — APPOINTMENT (OUTPATIENT)
Dept: CARDIOLOGY | Facility: HOSPITAL | Age: 80
End: 2025-01-03
Payer: COMMERCIAL

## 2025-01-03 VITALS — OXYGEN SATURATION: 99 % | WEIGHT: 165 LBS | BODY MASS INDEX: 23.62 KG/M2 | HEIGHT: 70 IN

## 2025-01-03 DIAGNOSIS — I34.0 SEVERE MITRAL REGURGITATION: ICD-10-CM

## 2025-01-03 DIAGNOSIS — I05.9 RHEUMATIC MITRAL VALVE DISEASE, UNSPECIFIED: ICD-10-CM

## 2025-01-03 DIAGNOSIS — I34.0 NONRHEUMATIC MITRAL VALVE REGURGITATION: Primary | ICD-10-CM

## 2025-01-03 DIAGNOSIS — I07.1 SEVERE TRICUSPID REGURGITATION: ICD-10-CM

## 2025-01-03 LAB
ATRIAL RATE: 51 BPM
P AXIS: 28 DEGREES
P OFFSET: 196 MS
P ONSET: 137 MS
PR INTERVAL: 160 MS
Q ONSET: 217 MS
QRS COUNT: 8 BEATS
QRS DURATION: 130 MS
QT INTERVAL: 446 MS
QTC CALCULATION(BAZETT): 411 MS
QTC FREDERICIA: 422 MS
R AXIS: 76 DEGREES
T AXIS: 17 DEGREES
T OFFSET: 440 MS
VENTRICULAR RATE: 51 BPM

## 2025-01-03 PROCEDURE — 99153 MOD SED SAME PHYS/QHP EA: CPT | Performed by: INTERNAL MEDICINE

## 2025-01-03 PROCEDURE — 2500000004 HC RX 250 GENERAL PHARMACY W/ HCPCS (ALT 636 FOR OP/ED): Performed by: INTERNAL MEDICINE

## 2025-01-03 PROCEDURE — 7100000010 HC PHASE TWO TIME - EACH INCREMENTAL 1 MINUTE: Performed by: INTERNAL MEDICINE

## 2025-01-03 PROCEDURE — 2550000001 HC RX 255 CONTRASTS: Performed by: INTERNAL MEDICINE

## 2025-01-03 PROCEDURE — C1894 INTRO/SHEATH, NON-LASER: HCPCS | Performed by: INTERNAL MEDICINE

## 2025-01-03 PROCEDURE — 99152 MOD SED SAME PHYS/QHP 5/>YRS: CPT | Performed by: INTERNAL MEDICINE

## 2025-01-03 PROCEDURE — 93456 R HRT CORONARY ARTERY ANGIO: CPT | Performed by: INTERNAL MEDICINE

## 2025-01-03 PROCEDURE — C1760 CLOSURE DEV, VASC: HCPCS | Performed by: INTERNAL MEDICINE

## 2025-01-03 PROCEDURE — 93005 ELECTROCARDIOGRAM TRACING: CPT

## 2025-01-03 PROCEDURE — 76937 US GUIDE VASCULAR ACCESS: CPT | Performed by: INTERNAL MEDICINE

## 2025-01-03 PROCEDURE — 2720000007 HC OR 272 NO HCPCS: Performed by: INTERNAL MEDICINE

## 2025-01-03 PROCEDURE — C1887 CATHETER, GUIDING: HCPCS | Performed by: INTERNAL MEDICINE

## 2025-01-03 PROCEDURE — 93010 ELECTROCARDIOGRAM REPORT: CPT | Performed by: INTERNAL MEDICINE

## 2025-01-03 PROCEDURE — 7100000009 HC PHASE TWO TIME - INITIAL BASE CHARGE: Performed by: INTERNAL MEDICINE

## 2025-01-03 RX ORDER — VERAPAMIL HYDROCHLORIDE 2.5 MG/ML
INJECTION, SOLUTION INTRAVENOUS AS NEEDED
Status: DISCONTINUED | OUTPATIENT
Start: 2025-01-03 | End: 2025-01-03 | Stop reason: HOSPADM

## 2025-01-03 RX ORDER — ACETAMINOPHEN 325 MG/1
650 TABLET ORAL EVERY 6 HOURS PRN
Status: DISCONTINUED | OUTPATIENT
Start: 2025-01-03 | End: 2025-01-03 | Stop reason: HOSPADM

## 2025-01-03 RX ORDER — FENTANYL CITRATE 50 UG/ML
INJECTION, SOLUTION INTRAMUSCULAR; INTRAVENOUS AS NEEDED
Status: DISCONTINUED | OUTPATIENT
Start: 2025-01-03 | End: 2025-01-03 | Stop reason: HOSPADM

## 2025-01-03 RX ORDER — HEPARIN SODIUM 1000 [USP'U]/ML
INJECTION, SOLUTION INTRAVENOUS; SUBCUTANEOUS AS NEEDED
Status: DISCONTINUED | OUTPATIENT
Start: 2025-01-03 | End: 2025-01-03 | Stop reason: HOSPADM

## 2025-01-03 RX ORDER — MIDAZOLAM HYDROCHLORIDE 1 MG/ML
INJECTION, SOLUTION INTRAMUSCULAR; INTRAVENOUS AS NEEDED
Status: DISCONTINUED | OUTPATIENT
Start: 2025-01-03 | End: 2025-01-03 | Stop reason: HOSPADM

## 2025-01-03 RX ORDER — LIDOCAINE HYDROCHLORIDE 10 MG/ML
INJECTION, SOLUTION EPIDURAL; INFILTRATION; INTRACAUDAL; PERINEURAL AS NEEDED
Status: DISCONTINUED | OUTPATIENT
Start: 2025-01-03 | End: 2025-01-03 | Stop reason: HOSPADM

## 2025-01-03 NOTE — Clinical Note
Patient Clipped and Prepped: groin, right radial and right brachial. Prepped with ChloraPrep, a minimum of 3 minute dry time, longer if needed, no pooling noted, patient draped in sterile fashion.

## 2025-01-03 NOTE — POST-PROCEDURE NOTE
Physician Transition of Care Summary  Invasive Cardiovascular Lab    Procedure Date: 1/3/2025  Attending:    * Devante Steel - Primary  Resident/Fellow/Other Assistant: Surgeons and Role:     * Art Olivas MD - Fellow    Indications:   Pre-op Diagnosis      * Nonrheumatic mitral valve regurgitation [I34.0]     * Severe mitral regurgitation [I34.0]     * Severe tricuspid regurgitation [I07.1]    Post-procedure diagnosis:   Post-op Diagnosis     * Nonrheumatic mitral valve regurgitation [I34.0]     * Severe mitral regurgitation [I34.0]     * Severe tricuspid regurgitation [I07.1]    Procedure(s):   Left & Right Heart Cath w Angiography & LV  85827 - CT R & L HRT CATH WINJX HRT ART& L VENTR IMG        Procedure Findings:   Uncomplicated right radial artery and right cephalic vein access    RHC:  RA- 14  RV- 42/9  PA- 45/17 (mean 27)  PCWP- 21  PA Sat- 71%  CO/CI- 6.19/3.21    Coronary angiogram-   Right dominant system,   Left main- No obstructive disease  LAD- No obstructive disease with D1 demonstrating 30% proximal disease,   LCX- 30- 40% proximal LCX with large OM1,    RCA- 30% proximal RCA with luminal irregularities, branching into PDA and PLV    Description of the Procedure:   As above    Complications:   None    Stents/Implants:   Implants       No implant documentation for this case.            Estimated Blood Loss:   5 mL    Anesthesia: Moderate Sedation Anesthesia Staff: No anesthesia staff entered.    Any Specimen(s) Removed:   No specimens collected during this procedure.    Disposition:   Home      Electronically signed by: Art Olivas MD, 1/3/2025 8:53 AM

## 2025-01-03 NOTE — H&P
History Of Present Illness  Willy Cordero is a 79 y.o. male presenting severe MR who presents today for a RHC/LHC.     Patient without complaints this morning. Has no concerns. He is ready for procedure. No questions. He is able to lay flat, has been NPO at midnight.      Past Medical History  Past Medical History:   Diagnosis Date    Anemia     7/30/24 H/H 12.9/40.0    Benign paroxysmal vertigo, unspecified ear     BPH (benign prostatic hyperplasia)     Calcification of coronary artery 03/19/2018    CT cardiac scoring done 12/15/16 with score of 436.      Calculus of gallbladder with acute cholecystitis without obstruction     s/p Cholecystectomy; Cholangiogram, Right Inguinal Hernia Repair; Mesh Placement 11/6/24    DDD (degenerative disc disease), lumbar     ED (erectile dysfunction)     GERD (gastroesophageal reflux disease)     diet controlled    Hearing aid worn     bilateral    Heart murmur     Hyperlipidemia     Myxomatous mitral valve regurgitation     ECHO 8/21/24 Severe mitral valve regurgitation, bileaflet prolapse    Right inguinal hernia     Scoliosis     no brace    Spinal stenosis     lumbar stenosis       Surgical History  Past Surgical History:   Procedure Laterality Date    CATARACT EXTRACTION Bilateral 2019    CHOLECYSTECTOMY      COLONOSCOPY  2019    LUMBAR FUSION      VASECTOMY          Social History  He reports that he has never smoked. He has never used smokeless tobacco. He reports current alcohol use of about 14.0 standard drinks of alcohol per week. He reports that he does not use drugs.    Family History  Family History   Problem Relation Name Age of Onset    Diabetes Mother Caryl Cordero     Hearing loss Mother Caryl Cordero     Heart disease Father Spenser Cordero     Breast cancer Sister Jane Rubi         Allergies  Sulfa (sulfonamide antibiotics)    Review of Systems   All other systems reviewed and are negative.       Physical Exam  Constitutional:       Appearance: Normal  "appearance.   HENT:      Head: Normocephalic and atraumatic.   Cardiovascular:      Rate and Rhythm: Normal rate and regular rhythm.   Pulmonary:      Effort: Pulmonary effort is normal.   Abdominal:      General: Abdomen is flat.      Palpations: Abdomen is soft.   Musculoskeletal:         General: No swelling. Normal range of motion.   Neurological:      General: No focal deficit present.      Mental Status: He is alert.   Psychiatric:         Mood and Affect: Mood normal.         Thought Content: Thought content normal.         Judgment: Judgment normal.          Last Recorded Vitals  Height 1.778 m (5' 10\"), weight 74.8 kg (165 lb).    Relevant Results           Assessment/Plan   Assessment & Plan  Mitral valve insufficiency    Severe mitral regurgitation    Severe tricuspid regurgitation      Willy Cordero is a 79 y.o. male presenting with severe MR who presents today for a RHC/LHC.     Will plan on proceeding for RHC/LHC today with procedural planning of mitral valve thereafter. Labs reviewed, no contraindication to proceed.              Art Olivas MD    "

## 2025-01-03 NOTE — Clinical Note
Closure device placed in the right radial artery. Site closed by radial compression system. 13cc of air

## 2025-01-04 DIAGNOSIS — E78.5 HYPERLIPIDEMIA, UNSPECIFIED: ICD-10-CM

## 2025-01-06 RX ORDER — ATORVASTATIN CALCIUM 80 MG/1
80 TABLET, FILM COATED ORAL DAILY
Qty: 90 TABLET | Refills: 0 | Status: SHIPPED | OUTPATIENT
Start: 2025-01-06

## 2025-01-09 PROBLEM — K40.90 RIGHT INGUINAL HERNIA: Status: RESOLVED | Noted: 2024-10-23 | Resolved: 2025-01-09

## 2025-01-09 PROBLEM — K21.9 GASTROESOPHAGEAL REFLUX DISEASE: Status: RESOLVED | Noted: 2024-09-24 | Resolved: 2025-01-09

## 2025-01-10 ENCOUNTER — APPOINTMENT (OUTPATIENT)
Dept: CARDIAC SURGERY | Facility: HOSPITAL | Age: 80
End: 2025-01-10
Payer: COMMERCIAL

## 2025-01-13 NOTE — PROGRESS NOTES
[facility]      Chief Complaint:  Follow up, discuss cath results and surgery    HPI:    Willy Cordero is a 78 year old male who is undergoing regular follow-up for mitral regurgitation.  He was referred for assessment for mitral valve intervention with worsening severity of his mitral regurgitation, and elevation in his pulmonary artery pressures.  Last seen on 11/5/24 for consultation.     He has little in the way of past medical history.       He recent underwent cholecystectomy at [facility] for gallstone disease.     His transthoracic echocardiogram revealed preserved left ventricular function with severe mitral regurgitation and bileaflet prolapse.  He also has moderate severe tricuspid regurgitation.  Right ventricular systolic pressure was 45 mmHg.    LHC performed 1/3/25 noted Non-obstructive coronary artery disease,  elevated right and left sided filling pressures with preserved CO/CI    He requested to see me for further discussion with regard to surgery.  I note that he has sought a second opinion from the [facility].    Also him today along with Dr. Steel.  We had a detailed discussion with him with regards to approaches for mitral valve repair as well as percutaneous approaches.  He requested to date towards the end of February which I will accommodate accordingly.    Look forward to seeing him in the near future for surgery and ask him to contact me if I could be of any further help or in the case of any further queries.        ____________________________________________________________  Don Sung MD, DPhil, FRCS  Professor of Surgery  Division of Cardiac Surgery  Director, Cardiothoracic Transplant    [facility]  [facility]

## 2025-01-17 ENCOUNTER — OFFICE VISIT (OUTPATIENT)
Dept: CARDIOLOGY | Facility: HOSPITAL | Age: 80
End: 2025-01-17
Payer: COMMERCIAL

## 2025-01-17 ENCOUNTER — OFFICE VISIT (OUTPATIENT)
Dept: CARDIAC SURGERY | Facility: HOSPITAL | Age: 80
End: 2025-01-17
Payer: COMMERCIAL

## 2025-01-17 VITALS
SYSTOLIC BLOOD PRESSURE: 182 MMHG | HEART RATE: 77 BPM | BODY MASS INDEX: 23.85 KG/M2 | HEIGHT: 70 IN | DIASTOLIC BLOOD PRESSURE: 76 MMHG | OXYGEN SATURATION: 97 % | WEIGHT: 166.6 LBS

## 2025-01-17 DIAGNOSIS — I34.0 MITRAL VALVE INSUFFICIENCY, UNSPECIFIED ETIOLOGY: ICD-10-CM

## 2025-01-17 DIAGNOSIS — I34.0 SEVERE MITRAL REGURGITATION: Primary | ICD-10-CM

## 2025-01-17 DIAGNOSIS — I07.1 TRICUSPID VALVE INSUFFICIENCY, UNSPECIFIED ETIOLOGY: Primary | ICD-10-CM

## 2025-01-17 PROCEDURE — 99212 OFFICE O/P EST SF 10 MIN: CPT | Performed by: INTERNAL MEDICINE

## 2025-01-17 PROCEDURE — 1123F ACP DISCUSS/DSCN MKR DOCD: CPT | Performed by: INTERNAL MEDICINE

## 2025-01-17 PROCEDURE — 99214 OFFICE O/P EST MOD 30 MIN: CPT | Performed by: THORACIC SURGERY (CARDIOTHORACIC VASCULAR SURGERY)

## 2025-01-17 PROCEDURE — 1126F AMNT PAIN NOTED NONE PRSNT: CPT | Performed by: THORACIC SURGERY (CARDIOTHORACIC VASCULAR SURGERY)

## 2025-01-17 PROCEDURE — 1123F ACP DISCUSS/DSCN MKR DOCD: CPT | Performed by: THORACIC SURGERY (CARDIOTHORACIC VASCULAR SURGERY)

## 2025-01-17 ASSESSMENT — ENCOUNTER SYMPTOMS
LOSS OF SENSATION IN FEET: 0
OCCASIONAL FEELINGS OF UNSTEADINESS: 0
DEPRESSION: 0

## 2025-01-17 ASSESSMENT — PAIN SCALES - GENERAL: PAINLEVEL_OUTOF10: 0-NO PAIN

## 2025-01-19 NOTE — PROGRESS NOTES
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 He ws referred by Dr Muniz.  He was noted to have a progression in the severity of his mitral regurgitation with associated symptoms and accordingly is here for discussion of options of mitral valve therapies.        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 Moderate to severe tricuspid regurgitation, tricuspid valve prolapse, normal Rv function, RVSP 45mmHg  RANJITH 11/26/2024- Severe MR, bileaflet, anterior leaflet prolapse predominant especially A2/A3,  mild TR   Cath- 1/3/2025 - No obstructive CAD  CTA TAVR - pending  Dental - pending       and Mrs Cordero were both present. We discussed that options of transcatheter and surgical mitral valve repair are available.    We discussed that the guidelines for the care of patients with primary mitral regurgitation in patients who have a low operative risk as he does is surgical mitral valve repair if there is expertise to guarantee repair.   If he has a high operative risk, then mitral SHELDON can be offered.  Dr Sung discussed surgical options and planned approach as well as timing.    The patient was seen with Dr Sung as part of multidisciplinary valve team evaluation.

## 2025-01-20 ENCOUNTER — HOSPITAL ENCOUNTER (OUTPATIENT)
Facility: HOSPITAL | Age: 80
Setting detail: SURGERY ADMIT
End: 2025-01-20
Attending: THORACIC SURGERY (CARDIOTHORACIC VASCULAR SURGERY) | Admitting: THORACIC SURGERY (CARDIOTHORACIC VASCULAR SURGERY)
Payer: COMMERCIAL

## 2025-01-20 PROBLEM — I07.1 TRICUSPID VALVE INSUFFICIENCY: Status: ACTIVE | Noted: 2025-01-17

## 2025-02-27 ENCOUNTER — APPOINTMENT (OUTPATIENT)
Dept: PREADMISSION TESTING | Facility: HOSPITAL | Age: 80
End: 2025-02-27
Payer: COMMERCIAL

## 2025-04-27 DIAGNOSIS — J01.11 ACUTE RECURRENT FRONTAL SINUSITIS: Primary | ICD-10-CM

## 2025-04-27 PROCEDURE — RXMED WILLOW AMBULATORY MEDICATION CHARGE

## 2025-04-27 RX ORDER — AMOXICILLIN AND CLAVULANATE POTASSIUM 500; 125 MG/1; MG/1
1 TABLET, FILM COATED ORAL 2 TIMES DAILY
Qty: 20 TABLET | Refills: 0 | Status: SHIPPED | OUTPATIENT
Start: 2025-04-27 | End: 2025-05-08

## 2025-04-28 ENCOUNTER — PHARMACY VISIT (OUTPATIENT)
Dept: PHARMACY | Facility: CLINIC | Age: 80
End: 2025-04-28
Payer: COMMERCIAL

## 2025-05-12 DIAGNOSIS — E78.5 HYPERLIPIDEMIA, UNSPECIFIED: ICD-10-CM

## 2025-05-12 RX ORDER — ATORVASTATIN CALCIUM 80 MG/1
80 TABLET, FILM COATED ORAL DAILY
Qty: 90 TABLET | Refills: 2 | Status: SHIPPED | OUTPATIENT
Start: 2025-05-12

## 2025-05-15 DIAGNOSIS — R05.9 COUGH IN ADULT: Primary | ICD-10-CM

## 2025-05-15 RX ORDER — BENZONATATE 200 MG/1
200 CAPSULE ORAL 3 TIMES DAILY PRN
Qty: 20 CAPSULE | Refills: 0 | Status: SHIPPED | OUTPATIENT
Start: 2025-05-15 | End: 2025-05-22

## 2025-06-19 ENCOUNTER — PATIENT OUTREACH (OUTPATIENT)
Dept: PRIMARY CARE | Facility: CLINIC | Age: 80
End: 2025-06-19
Payer: COMMERCIAL

## 2025-06-19 NOTE — PROGRESS NOTES
Discharge Facility: [facility]  Discharge Diagnosis:Mitral Valve Insuffiencey   Admission Date:6/12/25  Discharge Date: 6/18/25    PCP Appointment Date: Patient will schedule  Specialist Appointment Date: Cardiovascular  Hospital Encounter and Summary Linked: Yes  See discharge assessment below for further details  Hospital Encounter with CEDRIC RANGEL; Cedric Rangel MD      Wrap Up  Wrap Up Additional Comments: This CM spoke with pt via phone. Pt reports doing well at home since discharge. New meds reviewed. Pt denies CP and SOB. Patient denies any further discharge questions/needs at this time. Emphasized that Follow up is needed after discharge to review the hospital recommendations, assess your response to your treatment.  Pt aware of my availability for non-emergent concerns. Contact info provided to patient (6/19/2025  3:13 PM)    Engagement  Call Start Time: 1513 (6/19/2025  3:13 PM)    Medications  Medications reviewed with patient/caregiver?: Yes (6/19/2025  3:13 PM)  Is the patient having any side effects they believe may be caused by any medication additions or changes?: No (6/19/2025  3:13 PM)  Does the patient have all medications ordered at discharge?: Yes (6/19/2025  3:13 PM)  Prescription Comments: Ordered Prescriptions  Prescription Sig Dispense Quantity Refills Last Filled Start Date End Date  senna-docusate (SENNA-S) 8.6-50 mg per tablet   Take 1 tablet by mouth once daily.   30 tablet     06/18/2025 12:49 PM EDT 06/18/2025    pantoprazole DR (PROTONIX) 20 mg tablet   Take 1 tablet by mouth daily at 6 am.   30 tablet   2 06/18/2025 12:49 PM EDT 06/19/2025    oxyCODONE IR (ROXICODONE) 5 mg immediate release tablet   Indications: Postoperative pain Take 1-2 tablets by mouth every 6 hours as needed for up to 7 days.   28 tablet     06/18/2025 12:49 PM EDT 06/18/2025 06/25/2025  MULTIVITAMIN-FERROUS FUMARATE-FOLIC ACID 18 MG-400 MCG TABLET   Take 1 tablet by mouth daily with breakfast.   30  tablet   2 06/18/2025 12:49 PM EDT 06/18/2025    aspirin 81 mg chewable tablet   Take 1 tablet by mouth once daily.   30 tablet   2 06/18/2025 12:49 PM EDT 06/18/2025    apixaban (ELIQUIS) 5 mg tab(s)   Take 1 tablet by mouth two times a day.   60 tablet   2 06/18/2025 12:49 PM EDT 06/18/2025    acetaminophen (TYLENOL) 325 mg tablet   Take 2 tablets by mouth every 4 hours as needed for pain.       06/18/2025 (6/19/2025  3:13 PM)  Is the patient taking all medications as directed (includes completed medication regime)?: Yes (6/19/2025  3:13 PM)  Medication Comments: see med list (6/19/2025  3:13 PM)    Appointments  Does the patient have a primary care provider?: Yes (6/19/2025  3:13 PM)  Care Management Interventions: Educated patient on importance of making appointment; Advised patient to make appointment (6/19/2025  3:13 PM)  Has the patient kept scheduled appointments due by today?: Yes (6/19/2025  3:13 PM)  Care Management Interventions: Advised to schedule with specialist (Cardiovascular) (6/19/2025  3:13 PM)    Patient Teaching  Does the patient have access to their discharge instructions?: Yes (6/19/2025  3:13 PM)  Care Management Interventions: Reviewed instructions with patient (6/19/2025  3:13 PM)  What is the patient's perception of their health status since discharge?: Improving (6/19/2025  3:13 PM)  Is the patient/caregiver able to teach back the hierarchy of who to call/visit for symptoms/problems? PCP, Specialist, Home Health nurse, Urgent Care, ED, 911: Yes (6/19/2025  3:13 PM)          Ines Pope LPN

## 2025-07-11 ENCOUNTER — PATIENT OUTREACH (OUTPATIENT)
Dept: PRIMARY CARE | Facility: CLINIC | Age: 80
End: 2025-07-11
Payer: COMMERCIAL

## 2025-07-11 NOTE — PROGRESS NOTES
Confirmation of at least 2 patient identifiers.    Completed telephonic follow-up with patient approximately 14 days post discharge, no PCP follow up.   Spoke to patient during outreach call.    Patient reports feeling: Improved    Patient has questions or concerns about medications: No    Have all prescribed medications been filled? Yes    Patient has necessary resources to manage their care? Yes    Patient has questions or concerns? No    Next care management follow-up approximately within one month.  Care  information provided to patient.      Ines Pope LPN

## 2025-07-24 ENCOUNTER — APPOINTMENT (OUTPATIENT)
Dept: PRIMARY CARE | Facility: CLINIC | Age: 80
End: 2025-07-24
Payer: COMMERCIAL

## 2025-07-24 VITALS
DIASTOLIC BLOOD PRESSURE: 99 MMHG | HEIGHT: 70 IN | SYSTOLIC BLOOD PRESSURE: 157 MMHG | HEART RATE: 78 BPM | TEMPERATURE: 97.1 F | WEIGHT: 157 LBS | OXYGEN SATURATION: 98 % | BODY MASS INDEX: 22.48 KG/M2

## 2025-07-24 DIAGNOSIS — E78.2 MIXED HYPERLIPIDEMIA: ICD-10-CM

## 2025-07-24 DIAGNOSIS — I34.1 MITRAL VALVE PROLAPSE: Primary | ICD-10-CM

## 2025-07-24 DIAGNOSIS — Z98.890 S/P MVR (MITRAL VALVE REPAIR): ICD-10-CM

## 2025-07-24 PROCEDURE — 1036F TOBACCO NON-USER: CPT | Performed by: STUDENT IN AN ORGANIZED HEALTH CARE EDUCATION/TRAINING PROGRAM

## 2025-07-24 PROCEDURE — 99214 OFFICE O/P EST MOD 30 MIN: CPT | Performed by: STUDENT IN AN ORGANIZED HEALTH CARE EDUCATION/TRAINING PROGRAM

## 2025-07-24 PROCEDURE — 1126F AMNT PAIN NOTED NONE PRSNT: CPT | Performed by: STUDENT IN AN ORGANIZED HEALTH CARE EDUCATION/TRAINING PROGRAM

## 2025-07-24 ASSESSMENT — PAIN SCALES - GENERAL: PAINLEVEL_OUTOF10: 0-NO PAIN

## 2025-07-24 NOTE — PROGRESS NOTES
"Subjective   Patient ID: Willy Cordero is a 79 y.o. male who presents for No chief complaint on file..  History of Present Illness  Willy Cordero is a 79 year old male who presents for a post-surgery follow-up after mitral valve repair.    He underwent mitral valve repair on June 12, 2025, at the [facility]. Post-operatively, he experienced atrial flutter the day after the procedure. He had hallucinations and nightmares in the first one or two nights post-surgery, which have since resolved.    He underwent three cardioversions to address the atrial flutter, which initially converted him to normal sinus rhythm but he reverted back. He monitors his heart rhythm every other day using a personal device and believes he is in normal sinus rhythm. He was previously on metoprolol and amiodarone in the hospital, which led to bradycardia and required pacing. He is on the last few days of taking Eliquis, and is also taking Lipitor and a baby aspirin.    He has not yet started cardiac rehabilitation.      PMHx, FHx, Social Hx, Surg Hx personally reviewed at this appointment. No pertinent findings and/or changes from prior (if applicable).    ROS: Unless specified above, pt denies wt gain/loss f/c HA LoC CP SOB NVDC. See HPI above, and scanned sheet (if applicable). All other systems are reviewed and are without complaint.     Objective     BP (!) 157/99   Pulse 78   Temp 36.2 °C (97.1 °F)   Ht 1.778 m (5' 10\")   Wt 71.2 kg (157 lb)   SpO2 98%   BMI 22.53 kg/m²      Physical Exam  Gen: well developed in NAD. AAO x3.  HEENT: NC/AT. Anicteric sclera, symmetric pupils. MMM no thrush. Hearing aides bilaterally  Neck: Soft, supple. No LAD. No goiter.   CV: RRR nl s1s2 no m/r/g. Resolution of murmur since surgery.  Pulm: CTAB no w/r/r, good air exchange  GI: soft NTND BS+ no hsm  Ext: WWP no edema  Neuro: II-XII grossly intact, nonfocal systemic findings  MSK: 5/5 strength b/l UE and LE  Gait: unremarkable   Skin: well " healed thoracotomy scar from surgery        Current Outpatient Medications   Medication Instructions    apixaban (ELIQUIS) 5 mg, 2 times daily    atorvastatin (LIPITOR) 80 mg, oral, Daily        Lab Results   Component Value Date    WBC 4.3 (L) 12/19/2024    HGB 13.5 12/19/2024    HCT 40.7 (L) 12/19/2024     12/19/2024    CHOL 164 07/30/2024    TRIG 51 07/30/2024    HDL 83.7 07/30/2024    ALT 27 12/19/2024    AST 31 12/19/2024     12/19/2024    K 5.2 12/19/2024     12/19/2024    CREATININE 1.05 12/19/2024    BUN 25 (H) 12/19/2024    CO2 27 12/19/2024    TSH 2.18 10/13/2021    PSA 3.52 07/30/2024     par     ECG 12 lead STAT  Sinus bradycardia  Right bundle branch block  Abnormal ECG  When compared with ECG of 25-SEP-2024 15:05,  No significant change was found  Confirmed by Sheng Bland ([id number]) on 1/9/2025 8:49:13 AM         Assessment & Plan  Postoperative follow-up for mitral valve repair  Post-mitral valve repair with resolved atrial flutter, stable sinus rhythm, and resolved postoperative hallucinations.  - Complete the course of Eliquis as advised by the cardiologist.  - Continue baby aspirin indefinitely.  - Continue atorvastatin 80 mg daily.  - Initiate cardiac rehabilitation at the [facility].    Cholecystectomy follow-up  Increased bowel transit times post-cholecystectomy managed with dietary modifications.  - Consider prescribing cholestyramine if dietary modifications are insufficient.          Tyson Lr MD       This medical note was created with the assistance of artificial intelligence (AI) for documentation purposes. The content has been reviewed and confirmed by the healthcare provider for accuracy and completeness. Patient consented to the use of audio recording and use of AI during their visit.

## 2025-07-24 NOTE — PATIENT INSTRUCTIONS
Your blood work is up to date; no need for additional draw at this appointment.    Your medications are up to date today, I will renew them when a refill is due.     You are in sinus rhythm today.    Follow up with cardiac rehab and your cardiology team.

## 2025-08-23 DIAGNOSIS — I05.9 MITRAL VALVE DISORDER: Primary | ICD-10-CM

## 2025-08-23 RX ORDER — AMOXICILLIN 500 MG/1
2000 CAPSULE ORAL ONCE
Qty: 4 CAPSULE | Refills: 0 | Status: SHIPPED | OUTPATIENT
Start: 2025-08-23 | End: 2025-08-23

## (undated) DEVICE — INTRODUCER, GLIDESHEATH SLENDER A-KIT, 5FR 10CM

## (undated) DEVICE — TR BAND, RADIAL COMPRESSION, STANDARD, 24CM

## (undated) DEVICE — SHEAR, W/UNIPOLAR CAUTERY, ENDOSHEAR, 5 MM

## (undated) DEVICE — TROCAR SYSTEM, BALLOON, KII GELPORT, 12 X 100MM

## (undated) DEVICE — SUTURE, VICRYL, 0, 27 IN, UR-6, VIOLET

## (undated) DEVICE — CATHETER, DIAGNOSTIC, 4 FR-JR 4

## (undated) DEVICE — CATHETER, ANGIO, IMPULSE, FL3.5, 5 FR X 100 CM

## (undated) DEVICE — CANNULA, KII ADVANCED FIXATION, 5X100MM W/SEAL

## (undated) DEVICE — TUBE SET, PNEUMOLAR HEATED, SMOKE EVACU, HIGH-FLOW

## (undated) DEVICE — RETRIEVAL SYSTEM, MONARCH, 10MM DISP ENDOSCOPIC

## (undated) DEVICE — CLIP, ENDO, CLINCH II, W/RATCHET, ON/OFF, CLINCH II, 5 MM

## (undated) DEVICE — PUMP, STRYKERFLOW 2 & HANDPIECE W/10FT. IRRIGATION TUBING

## (undated) DEVICE — GUIDEWIRE, INQWIRE, 3MM J, .035 X 210CM, FIXED

## (undated) DEVICE — SCOPE WARMER, LAPAROSCOPE, BAG ONLY, LF

## (undated) DEVICE — SHEATH, GLIDESHEATH, SLENDER, 6FR 10CM

## (undated) DEVICE — SOLUTION, INJECTION, USP, SODIUM CHLORIDE 0.9%, .9, NACL, 1000 ML, BAG

## (undated) DEVICE — TOWEL, SURGICAL, NEURO, O/R, 16 X 26, BLUE, STERILE

## (undated) DEVICE — APPLIER, 5MM ENDOSCOPIC, HEM-O-LOCK, W/15 ML CLIPS

## (undated) DEVICE — GLOVE, SURGICAL, PROTEXIS PI ORTHO, 7.0, PF, LF

## (undated) DEVICE — CATHETER, WEDGE PRESSURE, BALLOON, DOUBLE LUMEN, 5 FR, 110 CM

## (undated) DEVICE — CLIP, ENDO APPLIER LIGAMAX 5MM

## (undated) DEVICE — CATHETER, DIAGNOSTIC, 4FR-3DRC

## (undated) DEVICE — DRAPE, C-ARM IMAGE

## (undated) DEVICE — ADHESIVE, SKIN, DERMABOND ADVANCED, 15CM, PEN-STYLE

## (undated) DEVICE — TRAY, SURESTEP, URINE METER, 16FR, COMPLETE, W/STATLOCK

## (undated) DEVICE — ACCESS KIT, MINI MAK, 4 FR X 10CM, 0.018 X 40CM, NITINOL/PLATINUM, STD NEEDLE

## (undated) DEVICE — CORD, MONOPOLAR, HIGH FREQUENCY, W/8MM PLUG F/VALLEYLAB, 8FT/244CM, STRL

## (undated) DEVICE — PAD, ELECTRODE DEFIB PADPRO ADULT STRL W/ADAPTER

## (undated) DEVICE — SUTURE, MONOCRYL, 4-0, 18 IN, PS2, UNDYED

## (undated) DEVICE — CATHETER, CHOLANGIOGRAM, 18 G X 11

## (undated) DEVICE — Device